# Patient Record
Sex: FEMALE | Race: WHITE | NOT HISPANIC OR LATINO | Employment: OTHER | URBAN - METROPOLITAN AREA
[De-identification: names, ages, dates, MRNs, and addresses within clinical notes are randomized per-mention and may not be internally consistent; named-entity substitution may affect disease eponyms.]

---

## 2017-04-20 ENCOUNTER — HOSPITAL ENCOUNTER (OUTPATIENT)
Dept: MAMMOGRAPHY | Facility: HOSPITAL | Age: 72
Discharge: HOME | End: 2017-04-20
Admitting: FAMILY MEDICINE

## 2017-04-20 DIAGNOSIS — Z12.31 VISIT FOR SCREENING MAMMOGRAM: ICD-10-CM

## 2017-04-20 PROCEDURE — G0202 SCR MAMMO BI INCL CAD: HCPCS | Performed by: RADIOLOGY

## 2017-04-20 PROCEDURE — 77063 BREAST TOMOSYNTHESIS BI: CPT

## 2017-04-20 PROCEDURE — G0202 SCR MAMMO BI INCL CAD: HCPCS

## 2017-04-20 PROCEDURE — 77063 BREAST TOMOSYNTHESIS BI: CPT | Performed by: RADIOLOGY

## 2017-04-27 ENCOUNTER — HOSPITAL ENCOUNTER (OUTPATIENT)
Dept: ULTRASOUND IMAGING | Facility: HOSPITAL | Age: 72
Discharge: HOME | End: 2017-04-27
Admitting: FAMILY MEDICINE

## 2017-04-27 ENCOUNTER — HOSPITAL ENCOUNTER (OUTPATIENT)
Dept: ULTRASOUND IMAGING | Facility: HOSPITAL | Age: 72
Discharge: HOME | End: 2017-04-27

## 2017-04-27 ENCOUNTER — HOSPITAL ENCOUNTER (OUTPATIENT)
Dept: MAMMOGRAPHY | Facility: HOSPITAL | Age: 72
Discharge: HOME | End: 2017-04-27

## 2017-04-27 DIAGNOSIS — R92.8 ABNORMAL MAMMOGRAM: ICD-10-CM

## 2017-04-27 PROCEDURE — 76642 ULTRASOUND BREAST LIMITED: CPT | Performed by: RADIOLOGY

## 2017-04-27 PROCEDURE — 88360 TUMOR IMMUNOHISTOCHEM/MANUAL: CPT | Performed by: RADIOLOGY

## 2017-04-27 PROCEDURE — G0206 DX MAMMO INCL CAD UNI: HCPCS | Performed by: RADIOLOGY

## 2017-04-27 PROCEDURE — 76642 ULTRASOUND BREAST LIMITED: CPT

## 2017-04-27 PROCEDURE — 88305 TISSUE EXAM BY PATHOLOGIST: CPT | Performed by: RADIOLOGY

## 2017-04-27 PROCEDURE — 19083 BX BREAST 1ST LESION US IMAG: CPT | Performed by: RADIOLOGY

## 2017-04-27 RX ORDER — LIDOCAINE HYDROCHLORIDE AND EPINEPHRINE 10; 10 MG/ML; UG/ML
10 INJECTION, SOLUTION INFILTRATION; PERINEURAL ONCE
Status: COMPLETED | OUTPATIENT
Start: 2017-04-27 | End: 2017-04-27

## 2017-04-27 RX ORDER — LIDOCAINE HYDROCHLORIDE 10 MG/ML
5 INJECTION, SOLUTION INFILTRATION; PERINEURAL ONCE
Status: COMPLETED | OUTPATIENT
Start: 2017-04-27 | End: 2017-04-27

## 2017-04-27 RX ADMIN — LIDOCAINE HYDROCHLORIDE,EPINEPHRINE BITARTRATE 8 ML: 10; .01 INJECTION, SOLUTION INFILTRATION; PERINEURAL at 14:13

## 2017-04-27 RX ADMIN — LIDOCAINE HYDROCHLORIDE 3 ML: 10 INJECTION, SOLUTION INFILTRATION; PERINEURAL at 14:10

## 2017-04-27 NOTE — PROGRESS NOTES
Alert and orientated. Denies discomfort. No active bleeding. Steri-strips & gauze intact. Ice packs given. Verbalizes and demonstrates understanding of post-care instructions and written copy given.

## 2017-05-03 LAB
CYTO UR: NORMAL
LAB AP CASE REPORT: NORMAL
LAB AP CLINICAL INFORMATION: NORMAL
LAB AP DIAGNOSIS COMMENT: NORMAL
LAB AP SPECIAL STAINS: NORMAL
Lab: NORMAL
PATH REPORT.FINAL DX SPEC: NORMAL
PATH REPORT.GROSS SPEC: NORMAL

## 2017-05-05 ENCOUNTER — TELEPHONE (OUTPATIENT)
Dept: MAMMOGRAPHY | Facility: HOSPITAL | Age: 72
End: 2017-05-05

## 2017-05-11 ENCOUNTER — DOCUMENTATION (OUTPATIENT)
Dept: OTHER | Facility: HOSPITAL | Age: 72
End: 2017-05-11

## 2017-05-16 ENCOUNTER — DOCUMENTATION (OUTPATIENT)
Dept: OTHER | Facility: HOSPITAL | Age: 72
End: 2017-05-16

## 2017-06-01 ENCOUNTER — TRANSCRIBE ORDERS (OUTPATIENT)
Dept: MAMMOGRAPHY | Facility: HOSPITAL | Age: 72
End: 2017-06-01

## 2017-06-01 DIAGNOSIS — D05.12 INTRADUCTAL CARCINOMA IN SITU OF LEFT BREAST: Primary | ICD-10-CM

## 2017-06-02 ENCOUNTER — DOCUMENTATION (OUTPATIENT)
Dept: OTHER | Facility: HOSPITAL | Age: 72
End: 2017-06-02

## 2017-06-02 NOTE — PROGRESS NOTES
Patient called to say that she did not understand wire loc. I explained it to her in detail - she verbalized understanding - she will have pre-op blood work done at there PCP with slip mailed from [facility] and will bring results to surgery as well as have PCP fax results to Dr Rodriguez's office

## 2017-06-13 ENCOUNTER — LAB REQUISITION (OUTPATIENT)
Dept: LAB | Facility: HOSPITAL | Age: 72
End: 2017-06-13

## 2017-06-13 ENCOUNTER — HOSPITAL ENCOUNTER (OUTPATIENT)
Dept: MAMMOGRAPHY | Facility: HOSPITAL | Age: 72
Discharge: HOME | End: 2017-06-13

## 2017-06-13 ENCOUNTER — HOSPITAL ENCOUNTER (OUTPATIENT)
Dept: MAMMOGRAPHY | Facility: HOSPITAL | Age: 72
Discharge: HOME | End: 2017-06-13
Attending: SURGERY | Admitting: SURGERY

## 2017-06-13 DIAGNOSIS — D05.12 INTRADUCTAL CARCINOMA IN SITU OF LEFT BREAST: ICD-10-CM

## 2017-06-13 PROCEDURE — 19283 PERQ DEV BREAST 1ST STRTCTC: CPT | Performed by: RADIOLOGY

## 2017-06-13 PROCEDURE — 76098 X-RAY EXAM SURGICAL SPECIMEN: CPT | Performed by: RADIOLOGY

## 2017-06-13 PROCEDURE — 76098 X-RAY EXAM SURGICAL SPECIMEN: CPT

## 2017-06-13 PROCEDURE — 88307 TISSUE EXAM BY PATHOLOGIST: CPT | Performed by: SURGERY

## 2017-06-13 PROCEDURE — G0206 DX MAMMO INCL CAD UNI: HCPCS | Performed by: RADIOLOGY

## 2017-06-13 RX ORDER — LIDOCAINE HYDROCHLORIDE 10 MG/ML
5 INJECTION, SOLUTION INFILTRATION; PERINEURAL ONCE
Status: COMPLETED | OUTPATIENT
Start: 2017-06-13 | End: 2017-06-13

## 2017-06-13 RX ADMIN — LIDOCAINE HYDROCHLORIDE 3 ML: 10 INJECTION, SOLUTION INFILTRATION; PERINEURAL at 09:25

## 2017-06-13 RX ADMIN — METHYLENE BLUE 10 MG: 10 INJECTION INTRAVENOUS at 09:26

## 2017-06-15 LAB
CYTO UR: NORMAL
LAB AP CASE REPORT: NORMAL
LAB AP CLINICAL INFORMATION: NORMAL
Lab: NORMAL
PATH REPORT.FINAL DX SPEC: NORMAL
PATH REPORT.GROSS SPEC: NORMAL

## 2017-06-28 PROBLEM — C50.919 BREAST CANCER: Status: ACTIVE | Noted: 2017-06-28

## 2017-06-29 ENCOUNTER — CONSULT (OUTPATIENT)
Dept: ONCOLOGY | Facility: CLINIC | Age: 72
End: 2017-06-29

## 2017-06-29 VITALS
BODY MASS INDEX: 33.49 KG/M2 | TEMPERATURE: 98.4 F | RESPIRATION RATE: 18 BRPM | SYSTOLIC BLOOD PRESSURE: 184 MMHG | HEIGHT: 65 IN | DIASTOLIC BLOOD PRESSURE: 82 MMHG | WEIGHT: 201 LBS | HEART RATE: 91 BPM

## 2017-06-29 DIAGNOSIS — C50.912 MALIGNANT NEOPLASM OF LEFT FEMALE BREAST, UNSPECIFIED SITE OF BREAST: Primary | ICD-10-CM

## 2017-06-29 PROCEDURE — 99205 OFFICE O/P NEW HI 60 MIN: CPT | Performed by: INTERNAL MEDICINE

## 2017-06-29 RX ORDER — OMEPRAZOLE 20 MG/1
20 CAPSULE, DELAYED RELEASE ORAL DAILY
COMMUNITY
End: 2022-05-10 | Stop reason: SDUPTHER

## 2017-06-29 RX ORDER — MELATONIN
1000 DAILY
COMMUNITY

## 2017-06-29 RX ORDER — ACETAMINOPHEN,DIPHENHYDRAMINE HCL 500; 25 MG/1; MG/1
2 TABLET, FILM COATED ORAL NIGHTLY PRN
COMMUNITY

## 2017-06-29 RX ORDER — ANASTROZOLE 1 MG/1
TABLET ORAL
Qty: 30 TABLET | Refills: 11 | Status: SHIPPED | OUTPATIENT
Start: 2017-06-29 | End: 2017-09-28 | Stop reason: SINTOL

## 2017-06-29 RX ORDER — LISINOPRIL 10 MG/1
10 TABLET ORAL DAILY
COMMUNITY
End: 2018-07-05 | Stop reason: DRUGHIGH

## 2017-06-29 NOTE — PROGRESS NOTES
CHIEF COMPLAINT: Management of breast cancer    REASON FOR REFERRAL: Management of breast cancer  RECORDS OBTAINED  Records of the patients history including those obtained from  Ozzie Samuel were reviewed and summarized in detail.    Oncology/Hematology History    1.  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  Area of involvement 0.5 cm, margins free, nuclear grade 1, estrogen receptor positive, progesteroneeceptor positive, stage pTis pNX, Stage 0.     2.  Past history of right breast stage IIa T2no MX hormone receptor positive HER-2/jairon negative breast cancer status post lumpectomy and sentinel node biopsy 2005 treated with radiation therapy and tamoxifen and tolerated tamoxifen well        Breast cancer    6/13/2017 Initial Diagnosis    Breast cancer         HISTORY OF PRESENT ILLNESS:  The patient is a 71 y.o.  female, referred for Management of breast cancer.  See above details for her history.  This was found on screening.  No symptoms.  REVIEW OF SYSTEMS:  A 14 point review of systems was performed and is negative except as noted above.    Past Medical History:   Diagnosis Date   • Breast cancer    • Hx of radiation therapy 2005     Past Surgical History:   Procedure Laterality Date   • BREAST BIOPSY Right    • BREAST LUMPECTOMY Right 2005       No current outpatient prescriptions on file prior to visit.     No current facility-administered medications on file prior to visit.        No Known Allergies    Social History     Social History   • Marital status:      Spouse name: N/A   • Number of children: N/A   • Years of education: N/A     Social History Main Topics   • Smoking status: Former Smoker   • Smokeless tobacco: Former User     Quit date: 2012   • Alcohol use None   • Drug use: None   • Sexual activity: Not Asked     Other Topics Concern   • None     Social History Narrative  "      Family History   Problem Relation Age of Onset   • Esophageal cancer Mother    • Lung cancer Sister    • Breast cancer Neg Hx    • Endometrial cancer Neg Hx    • Ovarian cancer Neg Hx        PHYSICAL EXAM:    BP (!) 184/82  Pulse 91  Temp 98.4 °F (36.9 °C)  Resp 18  Ht 65\" (165.1 cm)  Wt 201 lb (91.2 kg)  LMP  (LMP Unknown)  BMI 33.45 kg/m2    ECOG: (0) Fully active, able to carry on all predisease performance without restriction  General: well appearing female in no acute distress  HEENT: sclera anicteric, oropharynx clear  Lymphatics: no cervical, supraclavicular, inguinal, or axillary adenopathy  Cardiovascular: regular rate and rhythm, no murmurs  Neck: Supple; No thyromegaly  Lungs: clear to auscultation bilaterally. No respiratory distress.   Abdomen: soft, nontender, nondistended.  No palpable organomegaly  Extremities: no cyanosis, clubbing, edema, or cords  Skin: no rashes, lesions, bruising, or petechiae  Neuro: Alert and oriented x 4; Moving all extremities.  Psych: No anxiety or depression    Lab Requisition on 06/13/2017   Component Date Value Ref Range Status   • Case Report 06/13/2017    Final                    Value:Surgical Pathology Report                         Case: [id number]                                  Authorizing Provider:  Rashad Samuel MD         Collected:           06/13/2017 11:33 AM          Pathologist:           Arsalan Carrasquillo MD          Received:            06/13/2017 12:54 PM          Specimen:    Breast, Left                                                                              • Clinical Information 06/13/2017    Final                    Value:This result contains rich text formatting which cannot be displayed here.   • Final Diagnosis 06/13/2017    Final                    Value:This result contains rich text formatting which cannot be displayed here.   • Gross Description 06/13/2017    Final                    Value:This result contains rich text " formatting which cannot be displayed here.   • Microscopic Description 06/13/2017    Final                    Value:This result contains rich text formatting which cannot be displayed here.       Assessment/Plan     1. Ductal carcinoma in situ  2. Personal history of breast cancer    Discussion: She tolerated tamoxifen in the past.  Her uterus is still present.  She does not have a history of osteoporosis.  I will start her on Arimidex as much for primary as for secondary prevention purposes given her past history.  She is seeing Dr. Hua for radiation discussion.  This is a very well-differentiated 0.5 cm ductal carcinoma in situ arising out of a papilloma.  Its hormone receptor positive and we will treat her with Arimidex if she tolerates that if she doesn't would try tamoxifen and if she doesn't tolerate that than I would do nothing.  I have a very low threshold for stopping her adjuvant hormonal blockade.  We'll see my nurse practitioner back in a few months for a survivorship visit.      Uriel Leggett MD    6/29/2017

## 2017-07-12 ENCOUNTER — OFFICE VISIT (OUTPATIENT)
Dept: RADIATION ONCOLOGY | Facility: HOSPITAL | Age: 72
End: 2017-07-12

## 2017-07-12 ENCOUNTER — HOSPITAL ENCOUNTER (OUTPATIENT)
Dept: RADIATION ONCOLOGY | Facility: HOSPITAL | Age: 72
Setting detail: RADIATION/ONCOLOGY SERIES
Discharge: HOME | End: 2017-07-12

## 2017-07-12 VITALS
SYSTOLIC BLOOD PRESSURE: 169 MMHG | RESPIRATION RATE: 16 BRPM | HEART RATE: 97 BPM | DIASTOLIC BLOOD PRESSURE: 78 MMHG | HEIGHT: 65 IN | WEIGHT: 200.1 LBS | TEMPERATURE: 98.1 F | BODY MASS INDEX: 33.34 KG/M2

## 2017-07-12 DIAGNOSIS — C50.512 MALIGNANT NEOPLASM OF LOWER-OUTER QUADRANT OF LEFT FEMALE BREAST (HCC): Primary | ICD-10-CM

## 2017-07-12 PROCEDURE — G0463 HOSPITAL OUTPT CLINIC VISIT: HCPCS

## 2017-07-12 NOTE — PROGRESS NOTES
"CONSULTATION NOTE    NAME:      Letitia Salazar  :                                                          1945  DATE OF CONSULTATION:                       2017   REQUESTING PHYSICIAN:                   Rashad Samuel MD  REASON FOR CONSULTATION:           Breast cancer          Staging form: Breast, AJCC V7             Pathologic Stage 0 (Tis (DCIS), cN0, M0)     BRIEF HISTORY:  Letitia Salazar  is a very pleasant 71 y.o. female  with a history of right breast cancer , stage IIA, T2 N0 M0 ER/WI positive HER-2/jairon negative.  She was treated with lumpectomy, sentinel node biopsy and radiotherapy.  She then took tamoxifen.  She recently was found on mammogram to have an abnormality in the left breast and underwent lumpectomy that revealed low-grade ductal carcinoma in situ of 5 mm and margins were negative by 5 mm.  The tumor was ER/WI positive.  She has seen Dr. Leggett who is recommending Arimidex.  She is here to discuss postoperative radiotherapy.  She has no complaints from surgery.    No Known Allergies    Social History   Substance Use Topics   • Smoking status: Former Smoker   • Smokeless tobacco: Former User     Quit date:    • Alcohol use None       Past Medical History:   Diagnosis Date   • Breast cancer    • Hx of radiation therapy        family history includes Esophageal cancer in her mother; Lung cancer in her sister. There is no history of Breast cancer, Endometrial cancer, or Ovarian cancer.     Past Surgical History:   Procedure Laterality Date   • BREAST BIOPSY Right    • BREAST LUMPECTOMY Right         Review of Systems   All other systems reviewed and are negative.          Objective   VITAL SIGNS:   Vitals:    17 1056   BP: 169/78   Pulse: 97   Resp: 16   Temp: 98.1 °F (36.7 °C)   Weight: 200 lb 1.6 oz (90.8 kg)   Height: 65\" (165.1 cm)   PainSc: 0-No pain        KPS       90%    Physical Exam   Constitutional: She is oriented to person, place, and time. She appears " well-developed and well-nourished. No distress.   HENT:   Head: Normocephalic and atraumatic.   Eyes: EOM are normal. No scleral icterus.   Neck: Neck supple.   Cardiovascular: Normal rate, regular rhythm and normal heart sounds.    Pulmonary/Chest: Effort normal and breath sounds normal.   Lymphadenopathy:     She has no cervical adenopathy.   Neurological: She is alert and oriented to person, place, and time.   Skin: Skin is warm and dry.   Nursing note and vitals reviewed.  Examination of the breasts is negative.  She has a well healed surgical incision in the outer lower quadrant of the left breast.  The right breast is negative and she has no axillary adenopathy bilaterally.           The following portions of the patient's history were reviewed and updated as appropriate: allergies, current medications, past family history, past medical history, past social history, past surgical history and problem list.    Assessment      IMPRESSION:  Breast cancer    Staging form: Breast, AJCC V7     Pathologic Stage 0 (Tis (DCIS), cN0, M0)      RECOMMENDATIONS:  I recommend no radiotherapy for Mrs. Salazar.  She is over 69 years old with a low-grade tumor that is ER/HI positive and is willing to take Arimidex. This is consistent with NCCN guidelines.   She is delighted with this and we will see her back as needed.      Rachel Hua MD      Errors in dictation may reflect use of voice recognition software and not all errors in transcription may have been detected prior to signing.

## 2017-09-28 ENCOUNTER — CLINICAL SUPPORT (OUTPATIENT)
Dept: ONCOLOGY | Facility: CLINIC | Age: 72
End: 2017-09-28

## 2017-09-28 VITALS
HEIGHT: 65 IN | SYSTOLIC BLOOD PRESSURE: 154 MMHG | DIASTOLIC BLOOD PRESSURE: 79 MMHG | TEMPERATURE: 98.3 F | RESPIRATION RATE: 20 BRPM | BODY MASS INDEX: 33.66 KG/M2 | HEART RATE: 109 BPM | WEIGHT: 202 LBS

## 2017-09-28 DIAGNOSIS — D05.12 DUCTAL CARCINOMA IN SITU (DCIS) OF LEFT BREAST: ICD-10-CM

## 2017-09-28 DIAGNOSIS — R23.2 HOT FLASHES RELATED TO AROMATASE INHIBITOR THERAPY: Primary | ICD-10-CM

## 2017-09-28 DIAGNOSIS — Z85.3 HISTORY OF RIGHT BREAST CANCER: ICD-10-CM

## 2017-09-28 DIAGNOSIS — T45.1X5A HOT FLASHES RELATED TO AROMATASE INHIBITOR THERAPY: Primary | ICD-10-CM

## 2017-09-28 PROCEDURE — 99214 OFFICE O/P EST MOD 30 MIN: CPT | Performed by: NURSE PRACTITIONER

## 2017-09-28 RX ORDER — TAMOXIFEN CITRATE 20 MG/1
20 TABLET ORAL DAILY
Qty: 90 TABLET | Refills: 3
Start: 2017-09-28 | End: 2017-12-29

## 2017-09-28 NOTE — PROGRESS NOTES
CHIEF COMPLAINT: 1.  Follow-up for ductal carcinoma in situ                                       2.  Hot flashes    Problem List:  Oncology/Hematology History    1.  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  Area of involvement 0.5 cm, margins free, nuclear grade 1, estrogen receptor positive, progesteroneeceptor positive, stage pTis pNX, Stage 0.     2.  Past history of right breast stage IIa T2no MX hormone receptor positive HER-2/jairon negative breast cancer status post lumpectomy and sentinel node biopsy 2005 treated with radiation therapy and tamoxifen and tolerated tamoxifen well        Breast cancer    4/27/2017 Biopsy     Left breast mass.         6/13/2017 Initial Diagnosis     Breast cancer         6/13/2017 Surgery     Surgery       Procedure:  Lumpectomy      Location:  Left breast      Completeness of resection:  No evidence of residual tumor           6/29/2017 -  Hormonal Therapy     Arimidex.            HISTORY OF PRESENT ILLNESS:  The patient is a 72 y.o. female, here for follow up on management of DCIS of the left breast.  The patient reports that since being on Arimidex she is having significant hot flashes both throughout the day and at night.  These are quite bothersome to her.  She has no new or worrisome bony aches or pains.  No other complaints.      Past Medical History:   Diagnosis Date   • Breast cancer    • Hx of radiation therapy 2005     Past Surgical History:   Procedure Laterality Date   • BREAST BIOPSY Right    • BREAST LUMPECTOMY Right 2005       No Known Allergies    Family History and Social History reviewed and changed as necessary      REVIEW OF SYSTEM:   Review of Systems   Constitutional: Negative for appetite change, chills, diaphoresis, fatigue, fever and unexpected weight change.   HENT:   Negative for mouth sores, sore throat and trouble swallowing.   "  Eyes: Negative for icterus.   Respiratory: Negative for cough, hemoptysis and shortness of breath.    Cardiovascular: Negative for chest pain, leg swelling and palpitations.   Gastrointestinal: Negative for abdominal distention, abdominal pain, blood in stool, constipation, diarrhea, nausea and vomiting.   Endocrine: Positive for hot flashes.   Genitourinary: Negative for bladder incontinence, difficulty urinating, dysuria, frequency and hematuria.    Musculoskeletal: Negative for gait problem, neck pain and neck stiffness.   Skin: Negative for rash.   Neurological: Negative for dizziness, gait problem, headaches, light-headedness and numbness.   Hematological: Negative for adenopathy. Does not bruise/bleed easily.   Psychiatric/Behavioral: Negative for depression. The patient is not nervous/anxious.    All other systems reviewed and are negative.       PHYSICAL EXAM    Vitals:    09/28/17 1051   BP: 154/79   Pulse: 109   Resp: 20   Temp: 98.3 °F (36.8 °C)   Weight: 202 lb (91.6 kg)   Height: 65\" (165.1 cm)     Constitutional: Appears well-developed and well-nourished. No distress.   ECOG: (0) Fully active, able to carry on all predisease performance without restriction  HENT:   Head: Normocephalic.   Mouth/Throat: Oropharynx is clear and moist.   Eyes: Conjunctivae are normal. Pupils are equal, round, and reactive to light. No scleral icterus.   Neck: Neck supple. No JVD present. No thyromegaly present.   Cardiovascular: Normal rate, regular rhythm and normal heart sounds.    Pulmonary/Chest: Breath sounds normal. No respiratory distress.   Abdominal: Soft. Exhibits no distension.   Nodes: No cervical, supraclavicular or axillary nodes palpable on exam.    Musculoskeletal:Exhibits no edema, tenderness or deformity.   Neurological: Alert and oriented to person, place, and time. Exhibits normal muscle tone.   Skin: No ecchymosis, no petechiae and no rash noted. Not diaphoretic. No cyanosis. Nails show no clubbing. "   Psychiatric: Normal mood and affect.   Vitals reviewed.      Assessment/Plan     1.  DCIS of the left breast  2.  Hot flashes  3.  Personal history of right breast cancer    Discussion:  On Arimidex, Mrs. Salazar is having significant hot flashes.  She is interested in switching back to tamoxifen and I think this is a reasonable request.  We could consider keeping her on Arimidex and trying other medications to alleviate her hot flashes with gabapentin or clonidine or Effexor but these come with her own set of side effects and rather than adding a medication we will switch to tamoxifen.  She had tolerated this past.  I gave her a prescription today for tamoxifen 20 mg daily with quantity of 90 and 3 refills.  She will let me know if she has any issues.  We again reviewed the side effects of tamoxifen including but not limited to mood changes, hot flashes, endometrial thickening and slight increase of uterine cancer risk, blood clot, cataracts.  If she does not tolerate tamoxifen then we would stop adjuvant hormonal blockade.  We reviewed her survivorship care plan at today's appointment and she was provided a copy.  She will be due for post lumpectomy mammogram around December and I will arrange for that.  We discussed that it is typical standard procedure to do diagnostic mammogram after lumpectomy every 6 months for 2 years, she states that her insurance covers screening mammogram she is worried it will not cover diagnostic mammogram without her having to pay a significant co-pay.  I will check with our mammography center for further recommendations.  I will see her back in 3 months for follow-up to see how she is tolerating tamoxifen.  She will notify me in the interim if she has any new concerns.  She did meet with radiation oncology and there was no recommendation for radiation.      Veda Doe, APRN    09/28/2017

## 2017-10-10 DIAGNOSIS — Z85.3 HISTORY OF RIGHT BREAST CANCER: ICD-10-CM

## 2017-10-10 DIAGNOSIS — Z17.0 MALIGNANT NEOPLASM OF LOWER-OUTER QUADRANT OF LEFT BREAST OF FEMALE, ESTROGEN RECEPTOR POSITIVE (HCC): Primary | ICD-10-CM

## 2017-10-10 DIAGNOSIS — C50.512 MALIGNANT NEOPLASM OF LOWER-OUTER QUADRANT OF LEFT BREAST OF FEMALE, ESTROGEN RECEPTOR POSITIVE (HCC): Primary | ICD-10-CM

## 2017-12-21 ENCOUNTER — APPOINTMENT (OUTPATIENT)
Dept: MAMMOGRAPHY | Facility: HOSPITAL | Age: 72
End: 2017-12-21

## 2017-12-22 ENCOUNTER — HOSPITAL ENCOUNTER (OUTPATIENT)
Dept: MAMMOGRAPHY | Facility: HOSPITAL | Age: 72
Discharge: HOME | End: 2017-12-22
Admitting: NURSE PRACTITIONER

## 2017-12-22 DIAGNOSIS — Z85.3 HISTORY OF RIGHT BREAST CANCER: ICD-10-CM

## 2017-12-22 DIAGNOSIS — C50.512 MALIGNANT NEOPLASM OF LOWER-OUTER QUADRANT OF LEFT BREAST OF FEMALE, ESTROGEN RECEPTOR POSITIVE (HCC): ICD-10-CM

## 2017-12-22 DIAGNOSIS — Z17.0 MALIGNANT NEOPLASM OF LOWER-OUTER QUADRANT OF LEFT BREAST OF FEMALE, ESTROGEN RECEPTOR POSITIVE (HCC): ICD-10-CM

## 2017-12-22 PROCEDURE — G0204 DX MAMMO INCL CAD BI: HCPCS

## 2017-12-22 PROCEDURE — G0279 TOMOSYNTHESIS, MAMMO: HCPCS | Performed by: RADIOLOGY

## 2017-12-22 PROCEDURE — G0204 DX MAMMO INCL CAD BI: HCPCS | Performed by: RADIOLOGY

## 2017-12-22 PROCEDURE — G0279 TOMOSYNTHESIS, MAMMO: HCPCS

## 2017-12-29 ENCOUNTER — OFFICE VISIT (OUTPATIENT)
Dept: ONCOLOGY | Facility: CLINIC | Age: 72
End: 2017-12-29

## 2017-12-29 VITALS
RESPIRATION RATE: 20 BRPM | TEMPERATURE: 97.3 F | BODY MASS INDEX: 33.99 KG/M2 | SYSTOLIC BLOOD PRESSURE: 150 MMHG | DIASTOLIC BLOOD PRESSURE: 78 MMHG | WEIGHT: 204 LBS | HEIGHT: 65 IN | HEART RATE: 98 BPM

## 2017-12-29 DIAGNOSIS — D05.12 DUCTAL CARCINOMA IN SITU (DCIS) OF LEFT BREAST: Primary | ICD-10-CM

## 2017-12-29 PROCEDURE — 99213 OFFICE O/P EST LOW 20 MIN: CPT | Performed by: NURSE PRACTITIONER

## 2017-12-29 RX ORDER — ANASTROZOLE 1 MG/1
1 TABLET ORAL DAILY
Qty: 90 TABLET | Refills: 3 | Status: SHIPPED | OUTPATIENT
Start: 2017-12-29 | End: 2018-07-05 | Stop reason: SDUPTHER

## 2017-12-29 NOTE — PROGRESS NOTES
CHIEF COMPLAINT: Follow-up for breast cancer    Problem List:  Oncology/Hematology History    1.  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  Area of involvement 0.5 cm, margins free, nuclear grade 1, estrogen receptor positive, progesteroneeceptor positive, stage pTis pNX, Stage 0.     2.  Past history of right breast stage IIa T2no MX hormone receptor positive HER-2/jairon negative breast cancer status post lumpectomy and sentinel node biopsy 2005 treated with radiation therapy and tamoxifen and tolerated tamoxifen well        Breast cancer    4/27/2017 Biopsy     Left breast mass.         6/13/2017 Initial Diagnosis     Breast cancer         6/13/2017 Surgery     Surgery       Procedure:  Lumpectomy      Location:  Left breast      Completeness of resection:  No evidence of residual tumor           6/29/2017 -  Hormonal Therapy     Arimidex.            HISTORY OF PRESENT ILLNESS:  The patient is a 72 y.o. female, here for follow up on management of DCIS of the left breast.  After I saw the patient last in September and we discussed changing her from Arimidex to tamoxifen due to her significant hot flashes she stated that she went home and thought about it and decided to stay on Arimidex.  She states that she is tolerating it better and her hot flashes have diminished somewhat and are tolerable.  She had bilateral mammogram last week that was negative.  States that overall she is feeling well and has no new complaints.  No new or concerning bony aches or pains, no new or concerning findings on breast self-exam.      Past Medical History:   Diagnosis Date   • Breast cancer    • Hx of radiation therapy 2005     Past Surgical History:   Procedure Laterality Date   • BREAST BIOPSY Right    • BREAST LUMPECTOMY Right 2005   • BREAST LUMPECTOMY Left 2017       No Known Allergies    Family History and  "Social History reviewed and changed as necessary      REVIEW OF SYSTEM:   Review of Systems   Constitutional: Negative for appetite change, chills, diaphoresis, fatigue, fever and unexpected weight change.   HENT:   Negative for mouth sores, sore throat and trouble swallowing.    Eyes: Negative for icterus.   Respiratory: Negative for cough, hemoptysis and shortness of breath.    Cardiovascular: Negative for chest pain, leg swelling and palpitations.   Gastrointestinal: Negative for abdominal distention, abdominal pain, blood in stool, constipation, diarrhea, nausea and vomiting.   Endocrine: Negative for hot flashes.   Genitourinary: Negative for bladder incontinence, difficulty urinating, dysuria, frequency and hematuria.    Musculoskeletal: Negative for gait problem, neck pain and neck stiffness.   Skin: Negative for rash.   Neurological: Negative for dizziness, gait problem, headaches, light-headedness and numbness.   Hematological: Negative for adenopathy. Does not bruise/bleed easily.   Psychiatric/Behavioral: Negative for depression. The patient is not nervous/anxious.    All other systems reviewed and are negative.       PHYSICAL EXAM    Vitals:    12/29/17 1047   BP: 150/78   Pulse: 98   Resp: 20   Temp: 97.3 °F (36.3 °C)   Weight: 92.5 kg (204 lb)   Height: 165.1 cm (65\")     Constitutional: Appears well-developed and well-nourished. No distress.   ECOG: (0) Fully active, able to carry on all predisease performance without restriction  HENT:   Head: Normocephalic.   Mouth/Throat: Oropharynx is clear and moist.   Eyes: Conjunctivae are normal. Pupils are equal, round, and reactive to light. No scleral icterus.   Neck: Neck supple. No JVD present. No thyromegaly present.   Cardiovascular: Normal rate, regular rhythm and normal heart sounds.    Pulmonary/Chest: Breath sounds normal. No respiratory distress.   Breasts: Breast are pendulous, benign bilateral exam.  Nodes: No cervical, supraclavicular or " axillary nodes palpable on exam.  Abdominal: Soft. Exhibits no distension.   Musculoskeletal:Exhibits no edema, tenderness or deformity.   Neurological: Alert and oriented to person, place, and time. Exhibits normal muscle tone.   Skin: No ecchymosis, no petechiae and no rash noted. Not diaphoretic. No cyanosis. Nails show no clubbing.   Psychiatric: Normal mood and affect.   Vitals reviewed.    Diagnostic data: All previous office notes and current radiology reports including 12/22/2017 bilateral diagnostic mammogram that was BI-RADS 3, were reviewed at time of visit.    Assessment/Plan     1.  DCIS of the left breast  2.  Hot flashes  3.  Personal history of right breast cancer    Discussion: The patient decided after we saw her last in September not to switch to tamoxifen but to stay on Arimidex.  Her hot flashes have diminished and are tolerable.  I did give her a prescription today for Arimidex 1 mg daily quantity 90 with 3 refills.  No evidence of disease on clinical exam and no new worrisome symptoms.  Bilateral diagnostic mammogram last week was negative, BI-RADS 3.  She will be due for short-term 6 month follow-up mammogram of the left breast in June and I have ordered that today.  I will see her back following that for clinical exam.  She will need periodic monitoring of bone density testing while on Arimidex.    10 minutes of today's 15 minute appointment was spent in counseling and education regarding the above, prescription drug management and coordination of care.  Veda Doe, APRN    12/29/2017

## 2018-06-22 ENCOUNTER — APPOINTMENT (OUTPATIENT)
Dept: MAMMOGRAPHY | Facility: HOSPITAL | Age: 73
End: 2018-06-22

## 2018-07-02 ENCOUNTER — HOSPITAL ENCOUNTER (OUTPATIENT)
Dept: MAMMOGRAPHY | Facility: HOSPITAL | Age: 73
Discharge: HOME | End: 2018-07-02
Admitting: NURSE PRACTITIONER

## 2018-07-02 DIAGNOSIS — D05.12 DUCTAL CARCINOMA IN SITU (DCIS) OF LEFT BREAST: ICD-10-CM

## 2018-07-02 PROCEDURE — 77065 DX MAMMO INCL CAD UNI: CPT

## 2018-07-02 PROCEDURE — 77065 DX MAMMO INCL CAD UNI: CPT | Performed by: RADIOLOGY

## 2018-07-05 ENCOUNTER — OFFICE VISIT (OUTPATIENT)
Dept: ONCOLOGY | Facility: CLINIC | Age: 73
End: 2018-07-05

## 2018-07-05 VITALS
TEMPERATURE: 97.8 F | SYSTOLIC BLOOD PRESSURE: 158 MMHG | HEART RATE: 92 BPM | BODY MASS INDEX: 33.66 KG/M2 | RESPIRATION RATE: 18 BRPM | DIASTOLIC BLOOD PRESSURE: 81 MMHG | WEIGHT: 202 LBS | HEIGHT: 65 IN

## 2018-07-05 DIAGNOSIS — D05.12 DUCTAL CARCINOMA IN SITU (DCIS) OF LEFT BREAST: Primary | ICD-10-CM

## 2018-07-05 PROCEDURE — 99213 OFFICE O/P EST LOW 20 MIN: CPT | Performed by: NURSE PRACTITIONER

## 2018-07-05 RX ORDER — HYDROCHLOROTHIAZIDE 12.5 MG/1
TABLET ORAL DAILY
COMMUNITY
Start: 2018-06-14 | End: 2021-07-02

## 2018-07-05 RX ORDER — LISINOPRIL 40 MG/1
TABLET ORAL DAILY
COMMUNITY
Start: 2018-06-14 | End: 2022-09-01

## 2018-07-05 RX ORDER — ANASTROZOLE 1 MG/1
1 TABLET ORAL DAILY
Qty: 90 TABLET | Refills: 3 | Status: SHIPPED | OUTPATIENT
Start: 2018-07-05 | End: 2018-11-26 | Stop reason: SDUPTHER

## 2018-07-05 NOTE — PROGRESS NOTES
CHIEF COMPLAINT: Follow-up for breast cancer    Problem List:  Oncology/Hematology History    1.  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  Area of involvement 0.5 cm, margins free, nuclear grade 1, estrogen receptor positive, progesteroneeceptor positive, stage pTis pNX, Stage 0.     2.  Past history of right breast stage IIa T2no MX hormone receptor positive HER-2/jairon negative breast cancer status post lumpectomy and sentinel node biopsy 2005 treated with radiation therapy and tamoxifen and tolerated tamoxifen well        Breast cancer (CMS/HCC)    4/27/2017 Biopsy     Left breast mass.         6/13/2017 Initial Diagnosis     Breast cancer         6/13/2017 Surgery     Surgery       Procedure:  Lumpectomy      Location:  Left breast      Completeness of resection:  No evidence of residual tumor           6/29/2017 -  Hormonal Therapy     Arimidex.            HISTORY OF PRESENT ILLNESS:  The patient is a 72 y.o. female, here for follow up on management of DCIS of the left breast. Letitia has been feeling well since we saw her last with no new complaints.  States that she saw Dr. Samuel recently and had breast exam with no abnormal findings.  Had left diagnostic mammogram earlier this week that was BI-RADS 3.  No new or concerning findings on breast or chest wall exam, no new or concerning bony aches or pains.  Tolerating Arimidex with no side effects.  Hot flashes have diminished and are not bothersome.    Past Medical History:   Diagnosis Date   • Breast cancer (CMS/HCC)    • Hx of radiation therapy 2005     Past Surgical History:   Procedure Laterality Date   • BREAST BIOPSY Right    • BREAST LUMPECTOMY Right 2005   • BREAST LUMPECTOMY Left 2017       No Known Allergies    Family History and Social History reviewed and changed as necessary      REVIEW OF SYSTEM:   Review of Systems  "  Constitutional: Negative for appetite change, chills, diaphoresis, fatigue, fever and unexpected weight change.   HENT:   Negative for mouth sores, sore throat and trouble swallowing.    Eyes: Negative for icterus.   Respiratory: Negative for cough, hemoptysis and shortness of breath.    Cardiovascular: Negative for chest pain, leg swelling and palpitations.   Gastrointestinal: Negative for abdominal distention, abdominal pain, blood in stool, constipation, diarrhea, nausea and vomiting.   Endocrine: Negative for hot flashes.   Genitourinary: Negative for bladder incontinence, difficulty urinating, dysuria, frequency and hematuria.    Musculoskeletal: Negative for gait problem, neck pain and neck stiffness.   Skin: Negative for rash.   Neurological: Negative for dizziness, gait problem, headaches, light-headedness and numbness.   Hematological: Negative for adenopathy. Does not bruise/bleed easily.   Psychiatric/Behavioral: Negative for depression. The patient is not nervous/anxious.    All other systems reviewed and are negative.       PHYSICAL EXAM    Vitals:    07/05/18 0952   BP: 158/81   Pulse: 92   Resp: 18   Temp: 97.8 °F (36.6 °C)   Weight: 91.6 kg (202 lb)   Height: 165.1 cm (65\")     Constitutional: Appears well-developed and well-nourished. No distress.   ECOG: (0) Fully active, able to carry on all predisease performance without restriction  HENT:   Head: Normocephalic.   Mouth/Throat: Oropharynx is clear and moist.   Eyes: Conjunctivae are normal. Pupils are equal, round, and reactive to light. No scleral icterus.   Neck: Neck supple. No JVD present. No thyromegaly present.   Cardiovascular: Normal rate, regular rhythm and normal heart sounds.    Pulmonary/Chest: Breath sounds normal. No respiratory distress.   Breasts: Breast exam deferred  Nodes: No cervical, supraclavicular or axillary nodes palpable on exam.  Abdominal: Soft. Exhibits no distension.   Musculoskeletal:Exhibits no edema, tenderness " or deformity.   Neurological: Alert and oriented to person, place, and time. Exhibits normal muscle tone.   Skin: No ecchymosis, no petechiae and no rash noted. Not diaphoretic. No cyanosis. Nails show no clubbing.   Psychiatric: Normal mood and affect.   Vitals reviewed.    Diagnostic data: All previous office notes and current radiology reports including 7/02/2018 Left diagnostic mammogram that was BI-RADS 3, were reviewed at time of visit.    Assessment/Plan     1.  DCIS of the left breast  2.  Personal history of right breast cancer    Discussion: Letitia is doing well and has no evidence of disease on clinical exam and no new worrisome symptoms.  Current mammogram earlier this week of the left breast was negative, BI-RADS 3.  She will be due for bilateral diagnostic mammogram in 6 months, I will order that today.  Tolerating Arimidex, her hot flashes have diminished and are tolerable.  I did give her a prescription today for Arimidex 1 mg daily quantity 90 with 3 refills.  I will see her back in 6 months for follow-up, after that I will go to annual visits alternating with Dr. Samuel who sees her in June.  She will need periodic monitoring of bone density testing while on Arimidex, if she is not had bone density testing done by the time I see her back in 6 months I will order at that time.    10 minutes of today's 15 minute appointment was spent in counseling and education regarding the above, prescription drug management and coordination of care.  Veda Doe, BERNARD    07/05/2018

## 2018-11-26 ENCOUNTER — TELEPHONE (OUTPATIENT)
Dept: ONCOLOGY | Facility: CLINIC | Age: 73
End: 2018-11-26

## 2018-11-26 RX ORDER — ANASTROZOLE 1 MG/1
TABLET ORAL
Qty: 30 TABLET | Refills: 1 | Status: SHIPPED | OUTPATIENT
Start: 2018-11-26 | End: 2019-01-16 | Stop reason: SDUPTHER

## 2018-11-26 NOTE — TELEPHONE ENCOUNTER
PT notified of Arimidex 1mg #30 1RF sent in.  Keep appt with BERNARD Nino in Jan. 2019.    [id number]  ~Shell

## 2019-01-03 ENCOUNTER — HOSPITAL ENCOUNTER (OUTPATIENT)
Dept: MAMMOGRAPHY | Facility: HOSPITAL | Age: 74
Discharge: HOME | End: 2019-01-03
Admitting: NURSE PRACTITIONER

## 2019-01-03 ENCOUNTER — APPOINTMENT (OUTPATIENT)
Dept: MAMMOGRAPHY | Facility: HOSPITAL | Age: 74
End: 2019-01-03

## 2019-01-03 ENCOUNTER — HOSPITAL ENCOUNTER (OUTPATIENT)
Dept: ULTRASOUND IMAGING | Facility: HOSPITAL | Age: 74
Discharge: HOME | End: 2019-01-03

## 2019-01-03 DIAGNOSIS — D05.12 DUCTAL CARCINOMA IN SITU (DCIS) OF LEFT BREAST: ICD-10-CM

## 2019-01-03 PROCEDURE — G0279 TOMOSYNTHESIS, MAMMO: HCPCS | Performed by: RADIOLOGY

## 2019-01-03 PROCEDURE — 76642 ULTRASOUND BREAST LIMITED: CPT

## 2019-01-03 PROCEDURE — 77066 DX MAMMO INCL CAD BI: CPT | Performed by: RADIOLOGY

## 2019-01-03 PROCEDURE — G0279 TOMOSYNTHESIS, MAMMO: HCPCS

## 2019-01-03 PROCEDURE — 76642 ULTRASOUND BREAST LIMITED: CPT | Performed by: RADIOLOGY

## 2019-01-03 PROCEDURE — 77066 DX MAMMO INCL CAD BI: CPT

## 2019-01-16 ENCOUNTER — OFFICE VISIT (OUTPATIENT)
Dept: ONCOLOGY | Facility: CLINIC | Age: 74
End: 2019-01-16

## 2019-01-16 VITALS
BODY MASS INDEX: 33.49 KG/M2 | DIASTOLIC BLOOD PRESSURE: 72 MMHG | SYSTOLIC BLOOD PRESSURE: 154 MMHG | HEART RATE: 97 BPM | TEMPERATURE: 97.2 F | HEIGHT: 65 IN | WEIGHT: 201 LBS | RESPIRATION RATE: 20 BRPM

## 2019-01-16 DIAGNOSIS — Z12.31 ENCOUNTER FOR SCREENING MAMMOGRAM FOR BREAST CANCER: ICD-10-CM

## 2019-01-16 DIAGNOSIS — D05.12 DUCTAL CARCINOMA IN SITU (DCIS) OF LEFT BREAST: ICD-10-CM

## 2019-01-16 DIAGNOSIS — L73.2 AXILLARY HIDRADENITIS SUPPURATIVA: ICD-10-CM

## 2019-01-16 PROCEDURE — 99214 OFFICE O/P EST MOD 30 MIN: CPT | Performed by: NURSE PRACTITIONER

## 2019-01-16 RX ORDER — ANASTROZOLE 1 MG/1
1 TABLET ORAL DAILY
Qty: 90 TABLET | Refills: 3 | Status: SHIPPED | OUTPATIENT
Start: 2019-01-16 | End: 2020-01-13 | Stop reason: SDUPTHER

## 2019-01-16 NOTE — PROGRESS NOTES
CHIEF COMPLAINT: Follow-up for breast cancer    Problem List:  Oncology/Hematology History    1.  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  Area of involvement 0.5 cm, margins free, nuclear grade 1, estrogen receptor positive, progesteroneeceptor positive, stage pTis pNX, Stage 0.     2.  Past history of right breast stage IIa T2no MX hormone receptor positive HER-2/jairon negative breast cancer status post lumpectomy and sentinel node biopsy 2005 treated with radiation therapy and tamoxifen and tolerated tamoxifen well        Breast cancer (CMS/[facility])    4/27/2017 Biopsy     Left breast mass.         6/13/2017 Initial Diagnosis     Breast cancer         6/13/2017 Surgery     Surgery       Procedure:  Lumpectomy      Location:  Left breast      Completeness of resection:  No evidence of residual tumor           6/29/2017 -  Hormonal Therapy     Arimidex.            HISTORY OF PRESENT ILLNESS:  The patient is a 73 y.o. female, here for follow up on management of DCIS of the left breast. Letitia has been feeling well since we saw her last with no new complaints.  She is tolerating Arimidex with no unusual side effects, requesting a refill today.  Letitia had bilateral diagnostic mammogram and left breast ultrasound on 1/3/2019.  The right breast exam was benign, left breast showed lumpectomy changes with no suspicious calcifications or spiculated masses.  They did do ultrasound imaging of the left axillary region due to several prominent left axillary lymph nodes.  She has history of hydradenitis suppurativa and has been dealing with this for many years.  She was actually quite upset that they put her through what she felt like was excessive workup with her history.  She states that her left axillary region was sore but is now feeling better.  She did have lesions in the left axilla that have since  resolved, she now has one in her right axillary region.  She states these are no different than any of the areas of inflammation that she has had for years with this diagnosis.  Has had surgical removal at times, has underwent wound care, overall she feels it is stable and does not wish further intervention.    Past Medical History:   Diagnosis Date   • Breast cancer (CMS/HCC) 2017    LT   • Breast cancer (CMS/HCC) 2005    RT    • Hx of radiation therapy 2005    RT     Past Surgical History:   Procedure Laterality Date   • BREAST BIOPSY Right 2005   • BREAST LUMPECTOMY Right 2005   • BREAST LUMPECTOMY Left 2017       No Known Allergies    Family History and Social History reviewed and changed as necessary      REVIEW OF SYSTEM:   Review of Systems   Constitutional: Negative for appetite change, chills, diaphoresis, fatigue, fever and unexpected weight change.   HENT:   Negative for mouth sores, sore throat and trouble swallowing.    Eyes: Negative for icterus.   Respiratory: Negative for cough, hemoptysis and shortness of breath.    Cardiovascular: Negative for chest pain, leg swelling and palpitations.   Gastrointestinal: Negative for abdominal distention, abdominal pain, blood in stool, constipation, diarrhea, nausea and vomiting.   Endocrine: Negative for hot flashes.   Genitourinary: Negative for bladder incontinence, difficulty urinating, dysuria, frequency and hematuria.    Musculoskeletal: Negative for gait problem, neck pain and neck stiffness.   Skin: Negative for rash.   Neurological: Negative for dizziness, gait problem, headaches, light-headedness and numbness.   Hematological: Negative for adenopathy. Does not bruise/bleed easily.   Psychiatric/Behavioral: Negative for depression. The patient is not nervous/anxious.    All other systems reviewed and are negative.       PHYSICAL EXAM    Vitals:    01/16/19 0949   BP: 154/72   Pulse: 97   Resp: 20   Temp: 97.2 °F (36.2 °C)   Weight: 91.2 kg (201 lb)  "  Height: 165.1 cm (65\")     Constitutional: Appears well-developed and well-nourished. No distress.   ECOG: (0) Fully active, able to carry on all predisease performance without restriction  HENT:   Head: Normocephalic.   Mouth/Throat: Oropharynx is clear and moist.   Eyes: Conjunctivae are normal. Pupils are equal, round, and reactive to light. No scleral icterus.   Neck: Neck supple. No JVD present. No thyromegaly present.   Cardiovascular: Normal rate, regular rhythm and normal heart sounds.    Pulmonary/Chest: Breath sounds normal. No respiratory distress.   Breasts: Breast exam benign.  Nodes: No cervical, supraclavicular or axillary nodes palpable on exam.  Abdominal: Soft. Exhibits no distension.   Musculoskeletal:Exhibits no edema, tenderness or deformity.   Neurological: Alert and oriented to person, place, and time. Exhibits normal muscle tone.   Skin: No ecchymosis, no petechiae and no rash noted. Not diaphoretic. No cyanosis.   scarring noted in both axillary regions, greater on the left, there are a few open comedones bilaterally.  No lymphadenopathy.    Psychiatric: Normal mood and affect.   Vitals reviewed.    Diagnostic data: All previous office notes and current radiology reports including 1/3/19 bilateral diagnostic mammogram and ultrasound that was BI-RADS 3, were reviewed at time of visit.    Assessment/Plan     1.  DCIS of the left breast  2.  Personal history of right breast cancer  3.  Hydradenitis suppurativa    Discussion: Letitia is doing well and has no evidence of disease on clinical exam and no new worrisome symptoms.  Letitia had bilateral diagnostic mammogram and left breast ultrasound on 1/3/2019. The right breast exam was benign, left breast showed lumpectomy changes with no suspicious calcifications or spiculated masses.  They did do ultrasound imaging of the left axillary region due to \"several prominent left axillary lymph nodes\".  She has hydradenitis suppurativa and has been dealing " "with this for many years.   She states that her left axillary region was sore but is now feeling better.  She did have lesions in the left axilla that have since resolved, she now has one in her right axillary region, she has scarring and chronic skin changes.  She states these are no different than any of the areas of inflammation that she has had for years with this diagnosis.  Has had surgical removal previously with Dr. Samuel, has underwent wound care, overall she feels it is stable and does not wish further intervention.  Radiologist recommended 3 month left mammography follow-up however the patient states that she refuses to do this.  I do not think this is unreasonable given her history, I am comfortable not doing the three-month follow-up but just following her clinically.  We will now go to annual screening mammograms, she states she would prefer screening mammograms at this point as the diagnostic mammograms are an expense to her and the screening would be covered and it does not make her nervous to wait a week for the results.  We also discussed today getting bone density testing, she states that she declines testing stating \"I am just being honest, I am not going to do\".  She understands that there is risk of osteoporosis with Arimidex.  She will follow-up with Dr. Samuel in June who has been managing not only her breast cancer but has been seeing her for years in regards to her Hydradenitis suppurativa.  I will see her back in 1 year for follow-up this way she is seeing one of us every 6 months.  She understands to call if she has any concerns prior to return and I would be glad to see her at any time.  I did send a prescription on her Arimidex today, quantity of 90 with 3 refills.    20 minutes of today's 25 minute appointment was spent in counseling and education regarding the above, prescription drug management and coordination of care.    Veda Doe, APRN    01/16/2019          "

## 2019-08-09 ENCOUNTER — TRANSCRIBE ORDERS (OUTPATIENT)
Dept: ADMINISTRATIVE | Facility: HOSPITAL | Age: 74
End: 2019-08-09

## 2019-08-09 DIAGNOSIS — R92.8 ABNORMAL MAMMOGRAM: Primary | ICD-10-CM

## 2019-11-20 ENCOUNTER — TRANSCRIBE ORDERS (OUTPATIENT)
Dept: MAMMOGRAPHY | Facility: HOSPITAL | Age: 74
End: 2019-11-20

## 2019-11-20 ENCOUNTER — HOSPITAL ENCOUNTER (OUTPATIENT)
Dept: MAMMOGRAPHY | Facility: HOSPITAL | Age: 74
Discharge: HOME | End: 2019-11-20
Admitting: FAMILY MEDICINE

## 2019-11-20 DIAGNOSIS — R92.8 ABNORMAL MAMMOGRAM: Primary | ICD-10-CM

## 2019-11-20 DIAGNOSIS — R92.8 ABNORMAL MAMMOGRAM: ICD-10-CM

## 2019-11-20 PROCEDURE — 77065 DX MAMMO INCL CAD UNI: CPT | Performed by: RADIOLOGY

## 2019-11-20 PROCEDURE — 77065 DX MAMMO INCL CAD UNI: CPT

## 2020-01-13 ENCOUNTER — TELEPHONE (OUTPATIENT)
Dept: ONCOLOGY | Facility: CLINIC | Age: 75
End: 2020-01-13

## 2020-01-13 RX ORDER — ANASTROZOLE 1 MG/1
1 TABLET ORAL DAILY
Qty: 90 TABLET | Refills: 3 | Status: SHIPPED | OUTPATIENT
Start: 2020-01-13 | End: 2021-01-20 | Stop reason: SDUPTHER

## 2020-01-13 NOTE — TELEPHONE ENCOUNTER
Pt called [facility] stating that she is needing a prescription RF.  Pt did not list med nor preferred pharmacy.

## 2020-01-13 NOTE — TELEPHONE ENCOUNTER
[phone]  PT requesting Arimidex 1mg to be sent to [facility].  PT also requesting appt with BERNARD Nino in [place] office.  Please schedule appt for the next couple of weeks around 1030 per PT's request and call with appt information.  [phone]  ~Shell

## 2020-01-23 ENCOUNTER — OFFICE VISIT (OUTPATIENT)
Dept: ONCOLOGY | Facility: CLINIC | Age: 75
End: 2020-01-23

## 2020-01-23 VITALS
RESPIRATION RATE: 20 BRPM | WEIGHT: 203 LBS | DIASTOLIC BLOOD PRESSURE: 71 MMHG | HEART RATE: 94 BPM | BODY MASS INDEX: 33.82 KG/M2 | HEIGHT: 65 IN | SYSTOLIC BLOOD PRESSURE: 166 MMHG | TEMPERATURE: 98.1 F

## 2020-01-23 DIAGNOSIS — D05.12 DUCTAL CARCINOMA IN SITU (DCIS) OF LEFT BREAST: Primary | ICD-10-CM

## 2020-01-23 PROCEDURE — 99213 OFFICE O/P EST LOW 20 MIN: CPT | Performed by: NURSE PRACTITIONER

## 2020-01-23 NOTE — PROGRESS NOTES
CHIEF COMPLAINT: Follow-up DCIS of the left breast    Problem List:  Oncology/Hematology History    1.  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  Area of involvement 0.5 cm, margins free, nuclear grade 1, estrogen receptor positive, progesteroneeceptor positive, stage pTis pNX, Stage 0.     2.  Past history of right breast stage IIa T2no MX hormone receptor positive HER-2/jairon negative breast cancer status post lumpectomy and sentinel node biopsy 2005 treated with radiation therapy and tamoxifen and tolerated tamoxifen well        Breast cancer (CMS/[facility])    4/27/2017 Biopsy     Left breast mass.      6/13/2017 Initial Diagnosis     Breast cancer      6/13/2017 Surgery     Surgery       Procedure:  Lumpectomy      Location:  Left breast      Completeness of resection:  No evidence of residual tumor        6/29/2017 -  Hormonal Therapy     Arimidex.         HISTORY OF PRESENT ILLNESS:  The patient is a 74 y.o. female, here for follow up on management of DCIS of the left breast.  The patient is tolerating anastrozole with no unusual side effects.  She had left diagnostic mammogram in November that was negative, BI-RADS 3.  She is scheduled in February for bilateral diagnostic mammogram.  We had discussed when I saw her last diagnostic mammograms and she had decided she does not want to do those any longer however apparently our mammography department will not schedule her for screening mammogram therefore she states she will do 1 more diagnostic.  She has had no new or concerning findings on breast self-exam, no new or concerning bony aches or pains.      Past Medical History:   Diagnosis Date   • Breast cancer (CMS/[facility]) 2017    LT   • Breast cancer (CMS/[facility]) 2005    RT    • Hx of radiation therapy 2005    RT     Past Surgical History:   Procedure Laterality Date   • BREAST BIOPSY Right 2005   •  "BREAST LUMPECTOMY Right 2005   • BREAST LUMPECTOMY Left 2017       No Known Allergies    Family History and Social History reviewed and changed as necessary      REVIEW OF SYSTEM:   Review of Systems   Constitutional: Negative for appetite change, chills, diaphoresis, fatigue, fever and unexpected weight change.   HENT:   Negative for mouth sores, sore throat and trouble swallowing.    Eyes: Negative for icterus.   Respiratory: Negative for cough, hemoptysis and shortness of breath.    Cardiovascular: Negative for chest pain, leg swelling and palpitations.   Gastrointestinal: Negative for abdominal distention, abdominal pain, blood in stool, constipation, diarrhea, nausea and vomiting.   Endocrine: Negative for hot flashes.   Genitourinary: Negative for bladder incontinence, difficulty urinating, dysuria, frequency and hematuria.    Musculoskeletal: Negative for gait problem, neck pain and neck stiffness.   Skin: Negative for rash.   Neurological: Negative for dizziness, gait problem, headaches, light-headedness and numbness.   Hematological: Negative for adenopathy. Does not bruise/bleed easily.   Psychiatric/Behavioral: Negative for depression. The patient is not nervous/anxious.    All other systems reviewed and are negative.       PHYSICAL EXAM    Vitals:    01/23/20 1023   BP: 166/71   Pulse: 94   Resp: 20   Temp: 98.1 °F (36.7 °C)   Weight: 92.1 kg (203 lb)   Height: 165.1 cm (65\")     Constitutional: Appears well-developed and well-nourished. No distress.   ECOG: (0) Fully active, able to carry on all predisease performance without restriction  HENT:   Head: Normocephalic.   Mouth/Throat: Oropharynx is clear and moist.   Eyes: Conjunctivae are normal. Pupils are equal, round, and reactive to light. No scleral icterus.   Neck: Neck supple. No JVD present. No thyromegaly present.   Cardiovascular: Normal rate, regular rhythm and normal heart sounds.    Pulmonary/Chest: Breath sounds normal. No respiratory " distress.   Abdominal: Soft. Exhibits no distension and no mass. There is no hepatosplenomegaly. There is no tenderness. There is no rebound and no guarding.   Musculoskeletal:Exhibits no edema, tenderness or deformity.   Neurological: Alert and oriented to person, place, and time. Exhibits normal muscle tone.   Skin: No ecchymosis, no petechiae and no rash noted. Not diaphoretic. No cyanosis. Nails show no clubbing.   Psychiatric: Normal mood and affect.   Vitals reviewed.  Diagnostic data: All previous office notes and current radiology reports including 11/20/2019 left diagnostic mammogram that was BI-RADS 3 were reviewed at time of visit.      ASSESSMENT & PLAN:    1.  Ductal carcinoma in situ of the left breast status post lumpectomy June 2017, currently on therapy with Arimidex: No evidence of disease on clinical exam and no new worrisome symptoms.  The patient is tolerating Arimidex with no unusual side effects.  Refuses bone density testing.  She is up-to-date on mammography having had left diagnostic mammogram in November, she is due for bilateral diagnostic mammogram in February and that has been ordered and scheduled.  The patient has expressed previously that she did not want to do further diagnostic mammograms due to the cost.  Apparently our mammography center would not let her schedule a screening mammogram.  She states she will do 1 more diagnostic mammogram but after that she is requesting we go to screening mammograms.  I will see her back in 1 year for follow-up.    I spent a total of 15 minutes in direct patient care, greater than 10  minutes (greater than 50%) were spent in coordination of care, and counseling the patient regarding  (diagnosis) . Answered any questions patient had regarding medications and plan of care.     Veda Doe, APRN    01/23/2020

## 2020-02-21 ENCOUNTER — HOSPITAL ENCOUNTER (OUTPATIENT)
Dept: MAMMOGRAPHY | Facility: HOSPITAL | Age: 75
Discharge: HOME | End: 2020-02-21
Admitting: FAMILY MEDICINE

## 2020-02-21 DIAGNOSIS — R92.8 ABNORMAL MAMMOGRAM: ICD-10-CM

## 2020-02-21 PROCEDURE — G0279 TOMOSYNTHESIS, MAMMO: HCPCS | Performed by: RADIOLOGY

## 2020-02-21 PROCEDURE — G0279 TOMOSYNTHESIS, MAMMO: HCPCS

## 2020-02-21 PROCEDURE — 77066 DX MAMMO INCL CAD BI: CPT | Performed by: RADIOLOGY

## 2020-02-21 PROCEDURE — 77066 DX MAMMO INCL CAD BI: CPT

## 2021-01-15 ENCOUNTER — TRANSCRIBE ORDERS (OUTPATIENT)
Dept: ADMINISTRATIVE | Facility: HOSPITAL | Age: 76
End: 2021-01-15

## 2021-01-15 DIAGNOSIS — Z12.31 VISIT FOR SCREENING MAMMOGRAM: Primary | ICD-10-CM

## 2021-01-20 RX ORDER — ANASTROZOLE 1 MG/1
1 TABLET ORAL DAILY
Qty: 90 TABLET | Refills: 3 | Status: SHIPPED | OUTPATIENT
Start: 2021-01-20 | End: 2022-01-28 | Stop reason: SDUPTHER

## 2021-04-12 ENCOUNTER — APPOINTMENT (OUTPATIENT)
Dept: MAMMOGRAPHY | Facility: HOSPITAL | Age: 76
End: 2021-04-12

## 2021-04-19 ENCOUNTER — APPOINTMENT (OUTPATIENT)
Dept: MAMMOGRAPHY | Facility: HOSPITAL | Age: 76
End: 2021-04-19

## 2021-04-28 ENCOUNTER — TELEPHONE (OUTPATIENT)
Dept: ONCOLOGY | Facility: CLINIC | Age: 76
End: 2021-04-28

## 2021-04-28 NOTE — TELEPHONE ENCOUNTER
Caller: REYNA    Relationship: PT    Best call back number: [phone]     What is the best time to reach you: ANYTIME    What was the call regarding: REYNA CALLED TO CANCEL TOMORROW'S APPT. SHE WILL CALL BACK LATER ON TO RESCHEDULE.    Do you require a callback: NO

## 2021-05-13 ENCOUNTER — APPOINTMENT (OUTPATIENT)
Dept: GENERAL RADIOLOGY | Facility: HOSPITAL | Age: 76
End: 2021-05-13

## 2021-05-13 ENCOUNTER — HOSPITAL ENCOUNTER (EMERGENCY)
Facility: HOSPITAL | Age: 76
Discharge: HOME | End: 2021-05-14
Attending: EMERGENCY MEDICINE | Admitting: EMERGENCY MEDICINE

## 2021-05-13 DIAGNOSIS — R53.1 GENERALIZED WEAKNESS: Primary | ICD-10-CM

## 2021-05-13 DIAGNOSIS — R53.83 FATIGUE, UNSPECIFIED TYPE: ICD-10-CM

## 2021-05-13 LAB
ALBUMIN SERPL-MCNC: 4.1 G/DL (ref 3.5–5.2)
ALBUMIN/GLOB SERPL: 1.5 G/DL
ALP SERPL-CCNC: 80 U/L (ref 39–117)
ALT SERPL W P-5'-P-CCNC: 21 U/L (ref 1–33)
ANION GAP SERPL CALCULATED.3IONS-SCNC: 12 MMOL/L (ref 5–15)
AST SERPL-CCNC: 18 U/L (ref 1–32)
BACTERIA UR QL AUTO: NORMAL /HPF
BASOPHILS # BLD AUTO: 0.05 10*3/MM3 (ref 0–0.2)
BASOPHILS NFR BLD AUTO: 0.5 % (ref 0–1.5)
BILIRUB SERPL-MCNC: 0.2 MG/DL (ref 0–1.2)
BILIRUB UR QL STRIP: NEGATIVE
BUN SERPL-MCNC: 16 MG/DL (ref 8–23)
BUN/CREAT SERPL: 15.2 (ref 7–25)
CALCIUM SPEC-SCNC: 9.1 MG/DL (ref 8.6–10.5)
CHLORIDE SERPL-SCNC: 99 MMOL/L (ref 98–107)
CLARITY UR: CLEAR
CO2 SERPL-SCNC: 25 MMOL/L (ref 22–29)
COLOR UR: YELLOW
CREAT SERPL-MCNC: 1.05 MG/DL (ref 0.57–1)
DEPRECATED RDW RBC AUTO: 48.5 FL (ref 37–54)
EOSINOPHIL # BLD AUTO: 0.22 10*3/MM3 (ref 0–0.4)
EOSINOPHIL NFR BLD AUTO: 2.2 % (ref 0.3–6.2)
ERYTHROCYTE [DISTWIDTH] IN BLOOD BY AUTOMATED COUNT: 14.5 % (ref 12.3–15.4)
GFR SERPL CREATININE-BSD FRML MDRD: 51 ML/MIN/1.73
GLOBULIN UR ELPH-MCNC: 2.7 GM/DL
GLUCOSE SERPL-MCNC: 161 MG/DL (ref 65–99)
GLUCOSE UR STRIP-MCNC: NEGATIVE MG/DL
HCT VFR BLD AUTO: 39.9 % (ref 34–46.6)
HGB BLD-MCNC: 12.7 G/DL (ref 12–15.9)
HGB UR QL STRIP.AUTO: ABNORMAL
HOLD SPECIMEN: NORMAL
HOLD SPECIMEN: NORMAL
HYALINE CASTS UR QL AUTO: NORMAL /LPF
IMM GRANULOCYTES # BLD AUTO: 0.06 10*3/MM3 (ref 0–0.05)
IMM GRANULOCYTES NFR BLD AUTO: 0.6 % (ref 0–0.5)
KETONES UR QL STRIP: NEGATIVE
LEUKOCYTE ESTERASE UR QL STRIP.AUTO: ABNORMAL
LIPASE SERPL-CCNC: 59 U/L (ref 13–60)
LYMPHOCYTES # BLD AUTO: 3.18 10*3/MM3 (ref 0.7–3.1)
LYMPHOCYTES NFR BLD AUTO: 31.7 % (ref 19.6–45.3)
MCH RBC QN AUTO: 29 PG (ref 26.6–33)
MCHC RBC AUTO-ENTMCNC: 31.8 G/DL (ref 31.5–35.7)
MCV RBC AUTO: 91.1 FL (ref 79–97)
MONOCYTES # BLD AUTO: 0.81 10*3/MM3 (ref 0.1–0.9)
MONOCYTES NFR BLD AUTO: 8.1 % (ref 5–12)
NEUTROPHILS NFR BLD AUTO: 5.7 10*3/MM3 (ref 1.7–7)
NEUTROPHILS NFR BLD AUTO: 56.9 % (ref 42.7–76)
NITRITE UR QL STRIP: NEGATIVE
NRBC BLD AUTO-RTO: 0 /100 WBC (ref 0–0.2)
PH UR STRIP.AUTO: 5.5 [PH] (ref 5–8)
PLATELET # BLD AUTO: 336 10*3/MM3 (ref 140–450)
PMV BLD AUTO: 9.5 FL (ref 6–12)
POTASSIUM SERPL-SCNC: 4.2 MMOL/L (ref 3.5–5.2)
PROT SERPL-MCNC: 6.8 G/DL (ref 6–8.5)
PROT UR QL STRIP: NEGATIVE
RBC # BLD AUTO: 4.38 10*6/MM3 (ref 3.77–5.28)
RBC # UR: NORMAL /HPF
REF LAB TEST METHOD: NORMAL
SODIUM SERPL-SCNC: 136 MMOL/L (ref 136–145)
SP GR UR STRIP: 1.01 (ref 1–1.03)
SQUAMOUS #/AREA URNS HPF: NORMAL /HPF
UROBILINOGEN UR QL STRIP: ABNORMAL
WBC # BLD AUTO: 10.02 10*3/MM3 (ref 3.4–10.8)
WBC UR QL AUTO: NORMAL /HPF
WHOLE BLOOD HOLD SPECIMEN: NORMAL
WHOLE BLOOD HOLD SPECIMEN: NORMAL

## 2021-05-13 PROCEDURE — 99283 EMERGENCY DEPT VISIT LOW MDM: CPT

## 2021-05-13 PROCEDURE — 85025 COMPLETE CBC W/AUTO DIFF WBC: CPT | Performed by: EMERGENCY MEDICINE

## 2021-05-13 PROCEDURE — 81001 URINALYSIS AUTO W/SCOPE: CPT | Performed by: EMERGENCY MEDICINE

## 2021-05-13 PROCEDURE — 83690 ASSAY OF LIPASE: CPT | Performed by: EMERGENCY MEDICINE

## 2021-05-13 PROCEDURE — 71045 X-RAY EXAM CHEST 1 VIEW: CPT

## 2021-05-13 PROCEDURE — 80053 COMPREHEN METABOLIC PANEL: CPT | Performed by: EMERGENCY MEDICINE

## 2021-05-13 RX ORDER — SODIUM CHLORIDE 9 MG/ML
10 INJECTION INTRAVENOUS AS NEEDED
Status: DISCONTINUED | OUTPATIENT
Start: 2021-05-13 | End: 2021-05-14 | Stop reason: HOSPADM

## 2021-05-13 RX ORDER — MULTIPLE VITAMINS W/ MINERALS TAB 9MG-400MCG
1 TAB ORAL DAILY
COMMUNITY
End: 2022-05-10

## 2021-05-14 VITALS
HEIGHT: 65 IN | OXYGEN SATURATION: 97 % | BODY MASS INDEX: 33.32 KG/M2 | RESPIRATION RATE: 16 BRPM | TEMPERATURE: 98.1 F | SYSTOLIC BLOOD PRESSURE: 116 MMHG | HEART RATE: 87 BPM | DIASTOLIC BLOOD PRESSURE: 68 MMHG | WEIGHT: 200 LBS

## 2021-05-17 ENCOUNTER — OFFICE VISIT (OUTPATIENT)
Dept: CARDIOLOGY | Facility: CLINIC | Age: 76
End: 2021-05-17

## 2021-05-17 VITALS
OXYGEN SATURATION: 93 % | SYSTOLIC BLOOD PRESSURE: 156 MMHG | BODY MASS INDEX: 34.49 KG/M2 | WEIGHT: 207 LBS | HEIGHT: 65 IN | HEART RATE: 92 BPM | DIASTOLIC BLOOD PRESSURE: 78 MMHG

## 2021-05-17 DIAGNOSIS — R07.89 CHEST PAIN, ATYPICAL: Primary | ICD-10-CM

## 2021-05-17 PROCEDURE — 99203 OFFICE O/P NEW LOW 30 MIN: CPT | Performed by: PHYSICIAN ASSISTANT

## 2021-05-17 PROCEDURE — 93000 ELECTROCARDIOGRAM COMPLETE: CPT | Performed by: PHYSICIAN ASSISTANT

## 2021-05-17 NOTE — PROGRESS NOTES
[facility] at [facility]  INITIAL OFFICE CONSULT      Letitia Salazar  1945  PCP: Jeff Thompson MD    SUBJECTIVE:   Letitia Salazar is a 75 y.o. female seen for a consultation visit regarding the following:     Chief Complaint:   Chief Complaint   Patient presents with   • Rapid Heart Rate   • Irregular Heart Beat   • Palpitations   • Shortness of Breath   • Fatigue          Consultation is requested by BERNARD Coulter for evaluation of Rapid Heart Rate, Irregular Heart Beat, Palpitations, Shortness of Breath, and Fatigue        History:  Pleasant 75-year-old female presents today for initial consultation for primary care provider regarding chest pain shortness of breath.  Mrs. Deras denies any previous cardiac history.  She reports that her  had a longstanding cardiac history taken care of by Dr. Bryant.  The patient reports she has had 6 months of shortness of breath she is not been able to get to the physician she is concerned about the pandemic.  She is now had both her Covid shots.  Recent she feels her shortness of breath become progressively worse.  She also had some intermittent chest pain describes a tightness sensation central region her chest.  She denies any palpitations dizziness near syncope or syncope.  She denies heart failure/orthopnea PND palpitation.  Patient ports she did go to the emergency room regarding his symptoms and she was told that her laboratory data EKG were stable.  She did have a chest x-ray that revealed COPD.  She now presents today for further cardiac evaluation.      Cardiac PMH: (Old records have been reviewed and summarized below)  1. Chest pain  a. ER visit 5/14/2021, Negative cardiac markers, Normal EKG  2. SOB  3. HTN  4. Breast cancer, ductal carcinma  5. Tobacco abuse(Quit 2012)  6. HLD  7. Moderate Obesity  8. GERD    Past Medical History, Past Surgical History, Family history, Social History, and Medications were all  reviewed with the patient today and updated as necessary.     Current Outpatient Medications   Medication Sig Dispense Refill   • anastrozole (ARIMIDEX) 1 MG tablet Take 1 tablet by mouth Daily. 90 tablet 3   • cholecalciferol (VITAMIN D3) 1000 UNITS tablet Take 1,000 Units by mouth Daily.     • diphenhydrAMINE-acetaminophen (TYLENOL PM)  MG tablet per tablet Take 2 tablets by mouth At Night As Needed for Sleep.     • hydrochlorothiazide (HYDRODIURIL) 12.5 MG tablet Take  by mouth Daily.     • lisinopril (PRINIVIL,ZESTRIL) 40 MG tablet Take  by mouth Daily.     • multivitamin with minerals (PRESERVISION AREDS PO) Take 1 tablet by mouth Daily.     • omeprazole (priLOSEC) 20 MG capsule Take 20 mg by mouth Daily.       No current facility-administered medications for this visit.     No Known Allergies      Past Medical History:   Diagnosis Date   • Breast cancer (CMS/HCC)     LT   • Breast cancer (CMS/HCC)     RT    • Hx of radiation therapy     RT     Past Surgical History:   Procedure Laterality Date   • BREAST BIOPSY Right    • BREAST BIOPSY Left     u/s bx    • BREAST LUMPECTOMY Right    • BREAST LUMPECTOMY Left 2017     Family History   Problem Relation Age of Onset   • Esophageal cancer Mother    • Lung cancer Sister    • Breast cancer Neg Hx    • Endometrial cancer Neg Hx    • Ovarian cancer Neg Hx      Social History     Tobacco Use   • Smoking status: Former Smoker     Quit date:      Years since quittin.3   • Smokeless tobacco: Former User     Quit date:    Substance Use Topics   • Alcohol use: Not Currently       ROS:  Review of Symptoms:  General: no recent weight loss/gain, weakness or fatigue  Skin: no rashes, lumps, or other skin changes  HEENT: no dizziness, lightheadedness, or vision changes  Respiratory: no cough or hemoptysis  Cardiovascular: no palpitations, and tachycardia  Gastrointestinal: no black/tarry stools or diarrhea  Urinary: no change in frequency  "or urgency  Peripheral Vascular: no claudication or leg cramps  Musculoskeletal: no muscle or joint pain/stiffness  Psychiatric: no depression or excessive stress  Neurological: no sensory or motor loss, no syncope  Hematologic: no anemia, easy bruising or bleeding  Endocrine: no thyroid problems, nor heat or cold intolerance         PHYSICAL EXAM:   /78 (BP Location: Right arm, Patient Position: Sitting)   Pulse 92   Ht 165.1 cm (65\")   Wt 93.9 kg (207 lb)   LMP  (LMP Unknown)   SpO2 93%   BMI 34.45 kg/m²      Wt Readings from Last 5 Encounters:   05/17/21 93.9 kg (207 lb)   05/13/21 90.7 kg (200 lb)   01/23/20 92.1 kg (203 lb)   01/16/19 91.2 kg (201 lb)   07/05/18 91.6 kg (202 lb)     BP Readings from Last 5 Encounters:   05/17/21 156/78   05/14/21 116/68   01/23/20 166/71   01/16/19 154/72   07/05/18 158/81       General-Well Nourished, Well developed  Eyes - PERRLA  Neck- supple, No mass  CV- regular rate and rhythm, no MRG  Lung- clear bilaterally  Abd- soft, +BS  Musc/skel - Norm strength and range of motion  Skin- warm and dry  Neuro - Alert & Oriented x 3, appropriate mood.    Patient's external notes were reviewed.  Independent interpretation of test performed by another physician in facility were reviewed.  Outside laboratory data was also reviewed.    Medical problems and test results were reviewed with the patient today.     Results for orders placed or performed during the hospital encounter of 05/13/21   Comprehensive Metabolic Panel    Specimen: Blood   Result Value Ref Range    Glucose 161 (H) 65 - 99 mg/dL    BUN 16 8 - 23 mg/dL    Creatinine 1.05 (H) 0.57 - 1.00 mg/dL    Sodium 136 136 - 145 mmol/L    Potassium 4.2 3.5 - 5.2 mmol/L    Chloride 99 98 - 107 mmol/L    CO2 25.0 22.0 - 29.0 mmol/L    Calcium 9.1 8.6 - 10.5 mg/dL    Total Protein 6.8 6.0 - 8.5 g/dL    Albumin 4.10 3.50 - 5.20 g/dL    ALT (SGPT) 21 1 - 33 U/L    AST (SGOT) 18 1 - 32 U/L    Alkaline Phosphatase 80 39 - 117 U/L "    Total Bilirubin 0.2 0.0 - 1.2 mg/dL    eGFR Non African Amer 51 (L) >60 mL/min/1.73    Globulin 2.7 gm/dL    A/G Ratio 1.5 g/dL    BUN/Creatinine Ratio 15.2 7.0 - 25.0    Anion Gap 12.0 5.0 - 15.0 mmol/L   Lipase    Specimen: Blood   Result Value Ref Range    Lipase 59 13 - 60 U/L   Urinalysis With Microscopic If Indicated (No Culture) - Urine, Clean Catch    Specimen: Urine, Clean Catch   Result Value Ref Range    Color, UA Yellow Yellow, Straw    Appearance, UA Clear Clear    pH, UA 5.5 5.0 - 8.0    Specific Gravity, UA 1.011 1.001 - 1.030    Glucose, UA Negative Negative    Ketones, UA Negative Negative    Bilirubin, UA Negative Negative    Blood, UA Trace (A) Negative    Protein, UA Negative Negative    Leuk Esterase, UA Small (1+) (A) Negative    Nitrite, UA Negative Negative    Urobilinogen, UA 0.2 E.U./dL 0.2 - 1.0 E.U./dL   CBC Auto Differential    Specimen: Blood   Result Value Ref Range    WBC 10.02 3.40 - 10.80 10*3/mm3    RBC 4.38 3.77 - 5.28 10*6/mm3    Hemoglobin 12.7 12.0 - 15.9 g/dL    Hematocrit 39.9 34.0 - 46.6 %    MCV 91.1 79.0 - 97.0 fL    MCH 29.0 26.6 - 33.0 pg    MCHC 31.8 31.5 - 35.7 g/dL    RDW 14.5 12.3 - 15.4 %    RDW-SD 48.5 37.0 - 54.0 fl    MPV 9.5 6.0 - 12.0 fL    Platelets 336 140 - 450 10*3/mm3    Neutrophil % 56.9 42.7 - 76.0 %    Lymphocyte % 31.7 19.6 - 45.3 %    Monocyte % 8.1 5.0 - 12.0 %    Eosinophil % 2.2 0.3 - 6.2 %    Basophil % 0.5 0.0 - 1.5 %    Immature Grans % 0.6 (H) 0.0 - 0.5 %    Neutrophils, Absolute 5.70 1.70 - 7.00 10*3/mm3    Lymphocytes, Absolute 3.18 (H) 0.70 - 3.10 10*3/mm3    Monocytes, Absolute 0.81 0.10 - 0.90 10*3/mm3    Eosinophils, Absolute 0.22 0.00 - 0.40 10*3/mm3    Basophils, Absolute 0.05 0.00 - 0.20 10*3/mm3    Immature Grans, Absolute 0.06 (H) 0.00 - 0.05 10*3/mm3    nRBC 0.0 0.0 - 0.2 /100 WBC   Urinalysis, Microscopic Only - Urine, Clean Catch    Specimen: Urine, Clean Catch   Result Value Ref Range    RBC, UA 0-2 None Seen, 0-2 /HPF    WBC,  UA 0-2 None Seen, 0-2 /HPF    Bacteria, UA None Seen None Seen, Trace /HPF    Squamous Epithelial Cells, UA 0-2 None Seen, 0-2 /HPF    Hyaline Casts, UA 0-6 0 - 6 /LPF    Methodology Automated Microscopy    Light Blue Top   Result Value Ref Range    Extra Tube hold for add-on    Green Top (Gel)   Result Value Ref Range    Extra Tube Hold for add-ons.    Lavender Top   Result Value Ref Range    Extra Tube hold for add-on    Gold Top - SST   Result Value Ref Range    Extra Tube Hold for add-ons.          No results found for: CHOL, HDL, HDLC, LDL, LDLC, VLDL    EKG:  (EKG/Tracing has been independently visualized by me and summarized below)      ECG 12 Lead    Date/Time: 5/17/2021 10:56 AM  Performed by: Ifeanyi Drew PA  Authorized by: Ifeanyi Drew PA   Rhythm: sinus rhythm  Conduction: conduction normal  ST Segments: ST segments normal  T Waves: T waves normal  Other: no other findings    Clinical impression: normal ECG            ASSESSMENT   1. Chest pain  2. SOB  3. HTN, currently controlled on medications.   4. HLD  5. COPD, Remote Tobacco abuse.       PLAN  · Stress MPS  · Echocardiogram  · Consider Pulmonary evaluation regarding COPD.   · Follow up Dr. Bryant per Patient request.           Cardiology/Electrophysiology  05/17/21  09:32 EDT  Will Viki DELGADO

## 2021-05-20 ENCOUNTER — HOSPITAL ENCOUNTER (OUTPATIENT)
Dept: MAMMOGRAPHY | Facility: HOSPITAL | Age: 76
Discharge: HOME | End: 2021-05-20
Admitting: FAMILY MEDICINE

## 2021-05-20 DIAGNOSIS — Z12.31 VISIT FOR SCREENING MAMMOGRAM: ICD-10-CM

## 2021-05-20 PROCEDURE — 77063 BREAST TOMOSYNTHESIS BI: CPT | Performed by: RADIOLOGY

## 2021-05-20 PROCEDURE — 77063 BREAST TOMOSYNTHESIS BI: CPT

## 2021-05-20 PROCEDURE — 77067 SCR MAMMO BI INCL CAD: CPT

## 2021-05-20 PROCEDURE — 77067 SCR MAMMO BI INCL CAD: CPT | Performed by: RADIOLOGY

## 2021-05-24 ENCOUNTER — TELEPHONE (OUTPATIENT)
Dept: ONCOLOGY | Facility: CLINIC | Age: 76
End: 2021-05-24

## 2021-05-24 NOTE — TELEPHONE ENCOUNTER
Caller: LINA    Relationship to patient: PATIENT    Best call back number: [phone]     Type of visit: FOLLOW UP @ [facility]    Requested date: 05/27 AROUND 10:30AM    If rescheduling, when is the original appointment: 04/29    Additional notes: HUB UNABLE TO R/S WITHIN TIMEFRAME

## 2021-05-27 ENCOUNTER — OFFICE VISIT (OUTPATIENT)
Dept: ONCOLOGY | Facility: CLINIC | Age: 76
End: 2021-05-27

## 2021-05-27 VITALS
SYSTOLIC BLOOD PRESSURE: 138 MMHG | WEIGHT: 208 LBS | RESPIRATION RATE: 20 BRPM | TEMPERATURE: 97.3 F | BODY MASS INDEX: 34.66 KG/M2 | HEART RATE: 92 BPM | OXYGEN SATURATION: 97 % | HEIGHT: 65 IN | DIASTOLIC BLOOD PRESSURE: 68 MMHG

## 2021-05-27 DIAGNOSIS — D05.12 DUCTAL CARCINOMA IN SITU (DCIS) OF LEFT BREAST: Primary | ICD-10-CM

## 2021-05-27 PROCEDURE — 99214 OFFICE O/P EST MOD 30 MIN: CPT | Performed by: NURSE PRACTITIONER

## 2021-05-27 NOTE — PROGRESS NOTES
"CHIEF COMPLAINT: Breast cancer    Problem List:  Oncology/Hematology History Overview Note   1.  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  Area of involvement 0.5 cm, margins free, nuclear grade 1, estrogen receptor positive, progesteroneeceptor positive, stage pTis pNX, Stage 0.     2.  Past history of right breast stage IIa T2no MX hormone receptor positive HER-2/jairon negative breast cancer status post lumpectomy and sentinel node biopsy 2005 treated with radiation therapy and tamoxifen and tolerated tamoxifen well     Breast cancer (CMS/HCC)   4/27/2017 Biopsy    Left breast mass.     6/13/2017 Initial Diagnosis    Breast cancer     6/13/2017 Surgery    Surgery       Procedure:  Lumpectomy      Location:  Left breast      Completeness of resection:  No evidence of residual tumor       6/29/2017 -  Hormonal Therapy    Arimidex.         HISTORY OF PRESENT ILLNESS:  The patient is a 75 y.o. female, here for follow up on management of DCIS of the left breast currently on Arimidex, also past history of right breast cancer in 2005.  Letitia reports that she went to the ER earlier this month as she was \"just not feeling well\".  Lab work was unrevealing, she was scheduled to follow-up with her PCP and cardiology.  She saw Dr. Thompson earlier this week.  She has seen cardiology and is scheduled for a stress test and echocardiogram.  She states that she has some mild fatigue.  She had previously been having some nausea but that has resolved.  She has chronic mid back pain worse when she stands for a long period of time, the pain is relieved when she sits down and rest.  No other concerning bony aches or pains.  No concerning breast findings.  She has had her annual screening mammogram 5/20/2021 that was negative, BI-RADS 2.    Past Medical History:   Diagnosis Date   • Breast cancer (CMS/HCC) 2017    LT " "  • Breast cancer (CMS/HCC) 2005    RT    • Hx of radiation therapy 2005    RT     Past Surgical History:   Procedure Laterality Date   • BREAST BIOPSY Right 2005   • BREAST BIOPSY Left 2017    u/s bx    • BREAST LUMPECTOMY Right 2005   • BREAST LUMPECTOMY Left 2017       No Known Allergies    Family History and Social History reviewed and changed as necessary    REVIEW OF SYSTEM:   Positive for mild fatigue  Positive for intermittent back pain when standing    PHYSICAL EXAM:  General: Pleasant, well-developed, well-nourished female in no distress  Nodes: No palpable cervical, supraclavicular or axillary nodes on exam    Vitals:    05/27/21 1259   BP: 138/68   Pulse: 92   Resp: 20   Temp: 97.3 °F (36.3 °C)   SpO2: 97%   Weight: 94.3 kg (208 lb)   Height: 165.1 cm (65\")     Vitals:    05/27/21 1259   PainSc: 0-No pain          ECOG score: 0         Vitals reviewed.    Labs and ER notes from 5/14/2021 available in epic were reviewed at time of visit along with note from cardiology visit 5/17/2021    Lab Results   Component Value Date    HGB 12.7 05/13/2021    HCT 39.9 05/13/2021    MCV 91.1 05/13/2021     05/13/2021    WBC 10.02 05/13/2021    NEUTROABS 5.70 05/13/2021    LYMPHSABS 3.18 (H) 05/13/2021    MONOSABS 0.81 05/13/2021    EOSABS 0.22 05/13/2021    BASOSABS 0.05 05/13/2021       Lab Results   Component Value Date    GLUCOSE 161 (H) 05/13/2021    BUN 16 05/13/2021    CREATININE 1.05 (H) 05/13/2021     05/13/2021    K 4.2 05/13/2021    CL 99 05/13/2021    CO2 25.0 05/13/2021    CALCIUM 9.1 05/13/2021    PROTEINTOT 6.8 05/13/2021    ALBUMIN 4.10 05/13/2021    BILITOT 0.2 05/13/2021    ALKPHOS 80 05/13/2021    AST 18 05/13/2021    ALT 21 05/13/2021             ASSESSMENT & PLAN:    1.  Ductal carcinoma in situ of the left breast status post lumpectomy June 2017, currently on therapy with Arimidex: Letitia is tolerating Arimidex with no significant side effects.  She continues to refuse bone density " testing.  She is up-to-date on mammography having had bilateral screening mammogram 5/20/2021 that was negative, BI-RADS 2.  She will continue with annual screening mammogram.  She will continue Arimidex until I see her back in 1 year and then she will have completed 5 years of therapy.  I reviewed all notes from her recent ER visit and cardiology follow-up along with laboratory data 5/13/2021.  There is nothing that seems to be related or worrisome for recurrence of her breast cancer, she also has no symptoms currently that are worrisome for recurrence of her breast cancer.  Her back pain is intermittent and tends to occur when she stands which sounds more arthritic in nature.  She has had x-rays per her report through her primary care provider which showed arthritis.  Her alkaline phosphatase on CMP was normal.    Return to clinic in 1 year for follow-up.    This was a level 4, moderate MDM visit with 1 stable chronic illness, prescription drug management and review of current mammogram and review of recent ER records and laboratory data 5/13/2021 along with cardiology follow-up office note.  Veda Doe, APRN    05/27/2021

## 2021-06-25 ENCOUNTER — HOSPITAL ENCOUNTER (OUTPATIENT)
Dept: CARDIOLOGY | Facility: HOSPITAL | Age: 76
Discharge: HOME | End: 2021-06-25
Admitting: PHYSICIAN ASSISTANT

## 2021-06-25 VITALS — BODY MASS INDEX: 34.66 KG/M2 | WEIGHT: 208 LBS | HEIGHT: 65 IN

## 2021-06-25 VITALS
HEART RATE: 93 BPM | HEIGHT: 65 IN | DIASTOLIC BLOOD PRESSURE: 78 MMHG | BODY MASS INDEX: 34.66 KG/M2 | WEIGHT: 208 LBS | SYSTOLIC BLOOD PRESSURE: 136 MMHG

## 2021-06-25 DIAGNOSIS — R07.89 CHEST PAIN, ATYPICAL: ICD-10-CM

## 2021-06-25 LAB
BH CV ECHO MEAS - AO MAX PG (FULL): 7.3 MMHG
BH CV ECHO MEAS - AO MAX PG: 12 MMHG
BH CV ECHO MEAS - AO MEAN PG (FULL): 4.5 MMHG
BH CV ECHO MEAS - AO MEAN PG: 7.2 MMHG
BH CV ECHO MEAS - AO ROOT AREA (BSA CORRECTED): 1.5
BH CV ECHO MEAS - AO ROOT AREA: 7.5 CM^2
BH CV ECHO MEAS - AO ROOT DIAM: 3.1 CM
BH CV ECHO MEAS - AO V2 MAX: 169.7 CM/SEC
BH CV ECHO MEAS - AO V2 MEAN: 126.2 CM/SEC
BH CV ECHO MEAS - AO V2 VTI: 36.4 CM
BH CV ECHO MEAS - ASC AORTA: 2.5 CM
BH CV ECHO MEAS - AVA(I,A): 2.1 CM^2
BH CV ECHO MEAS - AVA(I,D): 2.1 CM^2
BH CV ECHO MEAS - AVA(V,A): 2.1 CM^2
BH CV ECHO MEAS - AVA(V,D): 2.1 CM^2
BH CV ECHO MEAS - BSA(HAYCOCK): 2.1 M^2
BH CV ECHO MEAS - BSA: 2 M^2
BH CV ECHO MEAS - BZI_BMI: 34.6 KILOGRAMS/M^2
BH CV ECHO MEAS - BZI_METRIC_HEIGHT: 165.1 CM
BH CV ECHO MEAS - BZI_METRIC_WEIGHT: 94.3 KG
BH CV ECHO MEAS - EDV(CUBED): 80.6 ML
BH CV ECHO MEAS - EDV(MOD-SP2): 62.6 ML
BH CV ECHO MEAS - EDV(MOD-SP4): 70.7 ML
BH CV ECHO MEAS - EDV(TEICH): 83.9 ML
BH CV ECHO MEAS - EF(CUBED): 65.7 %
BH CV ECHO MEAS - EF(MOD-BP): 56.4 %
BH CV ECHO MEAS - EF(MOD-SP2): 59.9 %
BH CV ECHO MEAS - EF(MOD-SP4): 55.2 %
BH CV ECHO MEAS - EF(TEICH): 57.5 %
BH CV ECHO MEAS - ESV(CUBED): 27.6 ML
BH CV ECHO MEAS - ESV(MOD-SP2): 25.1 ML
BH CV ECHO MEAS - ESV(MOD-SP4): 31.7 ML
BH CV ECHO MEAS - ESV(TEICH): 35.6 ML
BH CV ECHO MEAS - FS: 30 %
BH CV ECHO MEAS - IVS/LVPW: 0.81
BH CV ECHO MEAS - IVSD: 0.55 CM
BH CV ECHO MEAS - LA DIMENSION: 2.7 CM
BH CV ECHO MEAS - LA/AO: 0.89
BH CV ECHO MEAS - LAD MAJOR: 4.1 CM
BH CV ECHO MEAS - LAT PEAK E' VEL: 6.6 CM/SEC
BH CV ECHO MEAS - LATERAL E/E' RATIO: 13.5
BH CV ECHO MEAS - LV DIASTOLIC VOL/BSA (35-75): 35.1 ML/M^2
BH CV ECHO MEAS - LV IVRT: 0.11 SEC
BH CV ECHO MEAS - LV MASS(C)D: 74.9 GRAMS
BH CV ECHO MEAS - LV MASS(C)DI: 37.2 GRAMS/M^2
BH CV ECHO MEAS - LV MAX PG: 4.7 MMHG
BH CV ECHO MEAS - LV MEAN PG: 2.7 MMHG
BH CV ECHO MEAS - LV SYSTOLIC VOL/BSA (12-30): 15.8 ML/M^2
BH CV ECHO MEAS - LV V1 MAX: 108.8 CM/SEC
BH CV ECHO MEAS - LV V1 MEAN: 75.7 CM/SEC
BH CV ECHO MEAS - LV V1 VTI: 23.7 CM
BH CV ECHO MEAS - LVIDD: 4.3 CM
BH CV ECHO MEAS - LVIDS: 3 CM
BH CV ECHO MEAS - LVLD AP2: 7.1 CM
BH CV ECHO MEAS - LVLD AP4: 8 CM
BH CV ECHO MEAS - LVLS AP2: 5.8 CM
BH CV ECHO MEAS - LVLS AP4: 6.9 CM
BH CV ECHO MEAS - LVOT AREA (M): 3.1 CM^2
BH CV ECHO MEAS - LVOT AREA: 3.2 CM^2
BH CV ECHO MEAS - LVOT DIAM: 2 CM
BH CV ECHO MEAS - LVPWD: 0.67 CM
BH CV ECHO MEAS - MED PEAK E' VEL: 5.3 CM/SEC
BH CV ECHO MEAS - MEDIAL E/E' RATIO: 16.8
BH CV ECHO MEAS - MV A MAX VEL: 102.8 CM/SEC
BH CV ECHO MEAS - MV DEC SLOPE: 373.2 CM/SEC^2
BH CV ECHO MEAS - MV DEC TIME: 0.24 SEC
BH CV ECHO MEAS - MV E MAX VEL: 89.4 CM/SEC
BH CV ECHO MEAS - MV E/A: 0.87
BH CV ECHO MEAS - PA ACC TIME: 0.14 SEC
BH CV ECHO MEAS - PA PR(ACCEL): 17.2 MMHG
BH CV ECHO MEAS - SI(AO): 135.9 ML/M^2
BH CV ECHO MEAS - SI(CUBED): 26.3 ML/M^2
BH CV ECHO MEAS - SI(LVOT): 38.2 ML/M^2
BH CV ECHO MEAS - SI(MOD-SP2): 18.6 ML/M^2
BH CV ECHO MEAS - SI(MOD-SP4): 19.4 ML/M^2
BH CV ECHO MEAS - SI(TEICH): 24 ML/M^2
BH CV ECHO MEAS - SV(AO): 273.4 ML
BH CV ECHO MEAS - SV(CUBED): 53 ML
BH CV ECHO MEAS - SV(LVOT): 76.9 ML
BH CV ECHO MEAS - SV(MOD-SP2): 37.5 ML
BH CV ECHO MEAS - SV(MOD-SP4): 39 ML
BH CV ECHO MEAS - SV(TEICH): 48.3 ML
BH CV ECHO MEAS - TAPSE (>1.6): 1.9 CM
BH CV ECHO MEASUREMENTS AVERAGE E/E' RATIO: 15.03
BH CV VAS BP RIGHT ARM: NORMAL MMHG
BH CV XLRA - RV BASE: 2.8 CM
BH CV XLRA - RV LENGTH: 6.1 CM
BH CV XLRA - RV MID: 2.8 CM
BH CV XLRA - TDI S': 12.5 CM/SEC
LEFT ATRIUM VOLUME INDEX: 13.6 ML/M^2
LEFT ATRIUM VOLUME: 27.4 ML
LV EF 2D ECHO EST: 60 %
MAXIMAL PREDICTED HEART RATE: 144 BPM
STRESS TARGET HR: 122 BPM

## 2021-06-25 PROCEDURE — 93017 CV STRESS TEST TRACING ONLY: CPT

## 2021-06-25 PROCEDURE — 78452 HT MUSCLE IMAGE SPECT MULT: CPT

## 2021-06-25 PROCEDURE — 25010000002 REGADENOSON 0.4 MG/5ML SOLUTION: Performed by: PHYSICIAN ASSISTANT

## 2021-06-25 PROCEDURE — 93018 CV STRESS TEST I&R ONLY: CPT | Performed by: INTERNAL MEDICINE

## 2021-06-25 PROCEDURE — A9500 TC99M SESTAMIBI: HCPCS | Performed by: PHYSICIAN ASSISTANT

## 2021-06-25 PROCEDURE — 0 TECHNETIUM SESTAMIBI: Performed by: PHYSICIAN ASSISTANT

## 2021-06-25 PROCEDURE — 93306 TTE W/DOPPLER COMPLETE: CPT

## 2021-06-25 PROCEDURE — 93306 TTE W/DOPPLER COMPLETE: CPT | Performed by: INTERNAL MEDICINE

## 2021-06-25 PROCEDURE — 78452 HT MUSCLE IMAGE SPECT MULT: CPT | Performed by: INTERNAL MEDICINE

## 2021-06-25 RX ADMIN — TECHNETIUM TC 99M SESTAMIBI 1 DOSE: 1 INJECTION INTRAVENOUS at 10:35

## 2021-06-25 RX ADMIN — REGADENOSON 0.4 MG: 0.08 INJECTION, SOLUTION INTRAVENOUS at 12:23

## 2021-06-25 RX ADMIN — TECHNETIUM TC 99M SESTAMIBI 1 DOSE: 1 INJECTION INTRAVENOUS at 12:25

## 2021-06-26 LAB
BH CV REST NUCLEAR ISOTOPE DOSE: 9.9 MCI
BH CV STRESS BP STAGE 2: NORMAL
BH CV STRESS BP STAGE 4: NORMAL
BH CV STRESS COMMENTS STAGE 1: NORMAL
BH CV STRESS DOSE REGADENOSON STAGE 1: 0.4
BH CV STRESS DURATION MIN STAGE 1: 1
BH CV STRESS DURATION MIN STAGE 2: 1
BH CV STRESS HR STAGE 1: 109
BH CV STRESS HR STAGE 2: 109
BH CV STRESS HR STAGE 3: 103
BH CV STRESS HR STAGE 4: 105
BH CV STRESS NUCLEAR ISOTOPE DOSE: 32.9 MCI
BH CV STRESS O2 STAGE 2: 96
BH CV STRESS O2 STAGE 3: 95
BH CV STRESS O2 STAGE 4: 95
BH CV STRESS PROTOCOL 1: NORMAL
BH CV STRESS RECOVERY BP: NORMAL MMHG
BH CV STRESS RECOVERY HR: 98 BPM
BH CV STRESS STAGE 1: 1
BH CV STRESS STAGE 2: 2
BH CV STRESS STAGE 3: 3
BH CV STRESS STAGE 4: 4
LV EF NUC BP: 69 %
MAXIMAL PREDICTED HEART RATE: 144 BPM
PERCENT MAX PREDICTED HR: 78.47 %
STRESS BASELINE BP: NORMAL MMHG
STRESS BASELINE HR: 91 BPM
STRESS O2 SAT REST: 95 %
STRESS PERCENT HR: 92 %
STRESS POST EXERCISE DUR MIN: 4 MIN
STRESS POST EXERCISE DUR SEC: 17 SEC
STRESS POST PEAK BP: NORMAL MMHG
STRESS POST PEAK HR: 113 BPM
STRESS TARGET HR: 122 BPM

## 2021-07-02 ENCOUNTER — OFFICE VISIT (OUTPATIENT)
Dept: CARDIOLOGY | Facility: CLINIC | Age: 76
End: 2021-07-02

## 2021-07-02 VITALS
DIASTOLIC BLOOD PRESSURE: 72 MMHG | SYSTOLIC BLOOD PRESSURE: 144 MMHG | HEIGHT: 65 IN | HEART RATE: 105 BPM | OXYGEN SATURATION: 96 % | BODY MASS INDEX: 34.55 KG/M2 | WEIGHT: 207.4 LBS

## 2021-07-02 DIAGNOSIS — I10 ESSENTIAL HYPERTENSION: ICD-10-CM

## 2021-07-02 DIAGNOSIS — R07.89 CHEST PAIN, ATYPICAL: ICD-10-CM

## 2021-07-02 DIAGNOSIS — I25.10 CORONARY ARTERY DISEASE INVOLVING NATIVE CORONARY ARTERY OF NATIVE HEART WITHOUT ANGINA PECTORIS: Primary | ICD-10-CM

## 2021-07-02 DIAGNOSIS — E78.2 MIXED HYPERLIPIDEMIA: ICD-10-CM

## 2021-07-02 PROCEDURE — 99214 OFFICE O/P EST MOD 30 MIN: CPT | Performed by: INTERNAL MEDICINE

## 2021-07-02 RX ORDER — ATORVASTATIN CALCIUM 20 MG/1
20 TABLET, FILM COATED ORAL DAILY
Qty: 30 TABLET | Refills: 11 | Status: SHIPPED | OUTPATIENT
Start: 2021-07-02 | End: 2022-05-10 | Stop reason: SDUPTHER

## 2021-07-02 RX ORDER — HYDROCHLOROTHIAZIDE 25 MG/1
1 TABLET ORAL DAILY
COMMUNITY
Start: 2021-05-24 | End: 2022-05-10 | Stop reason: SDUPTHER

## 2021-07-02 NOTE — PROGRESS NOTES
[facility]  Office Progress Note  Letitia Salazar  1945  [address] [facility] [place] [address]       Visit Date: 07/02/21    PCP: Jeff Thompson MD  [address] [address]    IDENTIFICATION: A 75 y.o. female retired [employer].    PROBLEM LIST:   1. Chest pain  a. ER visit 5/14/2021, Negative cardiac markers, Normal EKG  b. 6/25/2020 one 2D echo LVEF =56 to 60%.  LV diastolic function (grade 1) impaired relaxation.  c. 6/25/2021 Lexiscan MPS: Lexiscan WNL.  LVEF calculated =69%.  Moderate diffuse coronary calcifications.  2. SOB  3. HTN  4. Breast cancer, ductal carcinma  5. Tobacco abuse(Quit 2012)  6. HLD  1. 2017: , , HDL 37, .  7. Moderate Obesity  8. GERD  9. Surgical  1. Breat Lumpectomy for Cancer        CC:   Chief Complaint   Patient presents with   • Shortness of Breath       Allergies  No Known Allergies    Current Medications    Current Outpatient Medications:   •  anastrozole (ARIMIDEX) 1 MG tablet, Take 1 tablet by mouth Daily., Disp: 90 tablet, Rfl: 3  •  cholecalciferol (VITAMIN D3) 1000 UNITS tablet, Take 1,000 Units by mouth Daily., Disp: , Rfl:   •  diphenhydrAMINE-acetaminophen (TYLENOL PM)  MG tablet per tablet, Take 2 tablets by mouth At Night As Needed for Sleep., Disp: , Rfl:   •  hydroCHLOROthiazide (HYDRODIURIL) 25 MG tablet, Take 1 tablet by mouth Daily., Disp: , Rfl:   •  lisinopril (PRINIVIL,ZESTRIL) 40 MG tablet, Take  by mouth Daily., Disp: , Rfl:   •  multivitamin with minerals (PRESERVISION AREDS PO), Take 1 tablet by mouth Daily., Disp: , Rfl:   •  omeprazole (priLOSEC) 20 MG capsule, Take 20 mg by mouth Daily., Disp: , Rfl:       History of Present Illness   Letitia Salazar is a 75 y.o. year old female here for follow up.  Today the patient is following up after recent evaluation in ER for chest pain, palpitations, and shortness of breath.  During that visit, the patient  "was evaluated and was not having acute cardiopulmonary event.  She was then seen after the ER visit by Chicho Drew for which she was sent for an echo and stress test.  The echo elucidated that she has normal pump function of her heart.  The stress test suggested that she had moderate diffuse coronary calcifications.  Upon further interrogation, the patient states that she has significant smoking history of 40 years for which she quit 9 years ago.  She denies any family history of coronary artery disease, heart failure, and cardiomyopathy. However, she does report that her sister and her  are patients of Dr. Bryant's and her sister has atrial fibrillation.  Further interrogation revealed that the patient believes that she may be in a prediabetic range of A1c and that she has a history of hyperlipidemia.    Her in-house blood pressure is 144/72. She states that her blood pressures are not usually that high but she does not report frequent monitoring of her blood pressure.  She also states that she is anxious when she comes into the doctor's office.    Currently the patient denies any cardiac symptoms such as chest pain, dyspnea, orthopnea, PND, palpitations, lower extremity edema, or claudication.      OBJECTIVE:  Vitals:    07/02/21 1527   BP: 144/72   BP Location: Right arm   Patient Position: Sitting   Pulse: 105   SpO2: 96%   Weight: 94.1 kg (207 lb 6.4 oz)   Height: 165.1 cm (65\")     Body mass index is 34.51 kg/m².    Vitals reviewed.   Constitutional:       General: Awake.      Appearance: Healthy appearance. Well-developed and not in distress. Obese.   Neck:      Thyroid: No thyromegaly.      Vascular: No carotid bruit.      Trachea: No tracheal deviation.   Pulmonary:      Effort: Pulmonary effort is normal. No respiratory distress.      Breath sounds: Normal breath sounds. No wheezing. No rales.   Cardiovascular:      Normal rate. Regular rhythm. Normal S1. Normal S2.      Murmurs: There is no " murmur.      No gallop. No and midsystolic click click. No rub.   Edema:     Peripheral edema absent.   Abdominal:      General: Bowel sounds are normal.      Palpations: Abdomen is soft. There is no abdominal mass.      Tenderness: There is no abdominal tenderness. There is no guarding.   Musculoskeletal:      Cervical back: Normal range of motion and neck supple. Skin:     General: Skin is warm and dry.   Neurological:      Mental Status: Alert and oriented to person, place, and time.   Psychiatric:         Attention and Perception: Attention normal.         Mood and Affect: Mood normal.         Speech: Speech normal.         Behavior: Behavior normal.         Diagnostic Data:  Procedures      ASSESSMENT:   Diagnosis Plan   1. Coronary artery disease involving native coronary artery of native heart without angina pectoris     2. Mixed hyperlipidemia     3. Chest pain, atypical     4. Essential hypertension         PLAN:  1.  CAD-patient not reporting any anginal symptoms no nor anginal equivalents in this visit.  CAD in context of normal echo and stress test that does not require acute intervention.  2.  Last labs in 2017 indicate patient needs to be placed on lipid-lowering medication.  Prescribed atorvastatin 20 mg.  3.  Blood pressure elevated in office today defer to PCP.      Scribe: Arsalan Bryant MD, FACC

## 2021-10-04 ENCOUNTER — TRANSCRIBE ORDERS (OUTPATIENT)
Dept: ADMINISTRATIVE | Facility: HOSPITAL | Age: 76
End: 2021-10-04

## 2021-10-04 DIAGNOSIS — Z12.31 VISIT FOR SCREENING MAMMOGRAM: Primary | ICD-10-CM

## 2022-01-28 RX ORDER — ANASTROZOLE 1 MG/1
1 TABLET ORAL DAILY
Qty: 90 TABLET | Refills: 1 | Status: SHIPPED | OUTPATIENT
Start: 2022-01-28

## 2022-01-28 NOTE — TELEPHONE ENCOUNTER
Anastrozole 1 mg tablet needs to be refilled patient states. Advised pt, I would leave a message for the nurse.

## 2022-05-10 ENCOUNTER — OFFICE VISIT (OUTPATIENT)
Dept: FAMILY MEDICINE CLINIC | Facility: CLINIC | Age: 77
End: 2022-05-10

## 2022-05-10 VITALS
HEIGHT: 65 IN | BODY MASS INDEX: 34.49 KG/M2 | OXYGEN SATURATION: 99 % | SYSTOLIC BLOOD PRESSURE: 158 MMHG | WEIGHT: 207 LBS | DIASTOLIC BLOOD PRESSURE: 92 MMHG | HEART RATE: 100 BPM

## 2022-05-10 DIAGNOSIS — I10 ESSENTIAL HYPERTENSION: Primary | ICD-10-CM

## 2022-05-10 DIAGNOSIS — E78.2 MIXED HYPERLIPIDEMIA: ICD-10-CM

## 2022-05-10 DIAGNOSIS — L57.0 ACTINIC KERATOSIS: ICD-10-CM

## 2022-05-10 DIAGNOSIS — K21.9 GASTROESOPHAGEAL REFLUX DISEASE WITHOUT ESOPHAGITIS: ICD-10-CM

## 2022-05-10 DIAGNOSIS — B35.9 DERMATOPHYTOSIS: ICD-10-CM

## 2022-05-10 DIAGNOSIS — J06.9 ACUTE URI: ICD-10-CM

## 2022-05-10 PROCEDURE — 99214 OFFICE O/P EST MOD 30 MIN: CPT | Performed by: FAMILY MEDICINE

## 2022-05-10 PROCEDURE — 17000 DESTRUCT PREMALG LESION: CPT | Performed by: FAMILY MEDICINE

## 2022-05-10 PROCEDURE — 36415 COLL VENOUS BLD VENIPUNCTURE: CPT | Performed by: FAMILY MEDICINE

## 2022-05-10 RX ORDER — CETIRIZINE HYDROCHLORIDE 10 MG/1
10 TABLET ORAL DAILY
Qty: 30 TABLET | Refills: 2 | Status: SHIPPED | OUTPATIENT
Start: 2022-05-10 | End: 2023-01-06

## 2022-05-10 RX ORDER — ATORVASTATIN CALCIUM 20 MG/1
20 TABLET, FILM COATED ORAL DAILY
Qty: 90 TABLET | Refills: 1 | Status: SHIPPED | OUTPATIENT
Start: 2022-05-10 | End: 2023-01-06 | Stop reason: SDUPTHER

## 2022-05-10 RX ORDER — HYDROCHLOROTHIAZIDE 25 MG/1
25 TABLET ORAL DAILY
Qty: 90 TABLET | Refills: 1 | Status: SHIPPED | OUTPATIENT
Start: 2022-05-10 | End: 2022-12-01

## 2022-05-10 RX ORDER — TERBINAFINE HYDROCHLORIDE 250 MG/1
250 TABLET ORAL DAILY
Qty: 90 TABLET | Refills: 0 | Status: SHIPPED | OUTPATIENT
Start: 2022-05-10 | End: 2023-01-06

## 2022-05-10 RX ORDER — OMEPRAZOLE 20 MG/1
20 CAPSULE, DELAYED RELEASE ORAL DAILY
Qty: 90 CAPSULE | Refills: 1 | Status: SHIPPED | OUTPATIENT
Start: 2022-05-10 | End: 2022-12-01

## 2022-05-10 RX ORDER — MUPIROCIN CALCIUM 20 MG/G
1 CREAM TOPICAL 3 TIMES DAILY
Qty: 30 G | Refills: 0 | Status: SHIPPED | OUTPATIENT
Start: 2022-05-10 | End: 2023-01-06

## 2022-05-11 PROBLEM — E78.2 MIXED HYPERLIPIDEMIA: Status: ACTIVE | Noted: 2022-05-11

## 2022-05-11 PROBLEM — K21.9 GASTROESOPHAGEAL REFLUX DISEASE WITHOUT ESOPHAGITIS: Status: ACTIVE | Noted: 2022-05-11

## 2022-05-11 LAB
ALBUMIN SERPL-MCNC: 4.6 G/DL (ref 3.7–4.7)
ALBUMIN/GLOB SERPL: 1.7 {RATIO} (ref 1.2–2.2)
ALP SERPL-CCNC: 112 IU/L (ref 44–121)
ALT SERPL-CCNC: 24 IU/L (ref 0–32)
AST SERPL-CCNC: 18 IU/L (ref 0–40)
BASOPHILS # BLD AUTO: 0.1 X10E3/UL (ref 0–0.2)
BASOPHILS NFR BLD AUTO: 1 %
BILIRUB SERPL-MCNC: 0.4 MG/DL (ref 0–1.2)
BUN SERPL-MCNC: 16 MG/DL (ref 8–27)
BUN/CREAT SERPL: 16 (ref 12–28)
CALCIUM SERPL-MCNC: 9.8 MG/DL (ref 8.7–10.3)
CHLORIDE SERPL-SCNC: 101 MMOL/L (ref 96–106)
CHOLEST SERPL-MCNC: 160 MG/DL (ref 100–199)
CO2 SERPL-SCNC: 23 MMOL/L (ref 20–29)
CREAT SERPL-MCNC: 1 MG/DL (ref 0.57–1)
EGFRCR SERPLBLD CKD-EPI 2021: 58 ML/MIN/1.73
EOSINOPHIL # BLD AUTO: 0.1 X10E3/UL (ref 0–0.4)
EOSINOPHIL NFR BLD AUTO: 1 %
ERYTHROCYTE [DISTWIDTH] IN BLOOD BY AUTOMATED COUNT: 13.6 % (ref 11.7–15.4)
GLOBULIN SER CALC-MCNC: 2.7 G/DL (ref 1.5–4.5)
GLUCOSE SERPL-MCNC: 138 MG/DL (ref 65–99)
HCT VFR BLD AUTO: 38.2 % (ref 34–46.6)
HDLC SERPL-MCNC: 32 MG/DL
HGB BLD-MCNC: 12.7 G/DL (ref 11.1–15.9)
IMM GRANULOCYTES # BLD AUTO: 0.1 X10E3/UL (ref 0–0.1)
IMM GRANULOCYTES NFR BLD AUTO: 1 %
LDLC SERPL CALC-MCNC: 98 MG/DL (ref 0–99)
LYMPHOCYTES # BLD AUTO: 1.7 X10E3/UL (ref 0.7–3.1)
LYMPHOCYTES NFR BLD AUTO: 15 %
MCH RBC QN AUTO: 29.3 PG (ref 26.6–33)
MCHC RBC AUTO-ENTMCNC: 33.2 G/DL (ref 31.5–35.7)
MCV RBC AUTO: 88 FL (ref 79–97)
MONOCYTES # BLD AUTO: 0.7 X10E3/UL (ref 0.1–0.9)
MONOCYTES NFR BLD AUTO: 6 %
NEUTROPHILS # BLD AUTO: 8.8 X10E3/UL (ref 1.4–7)
NEUTROPHILS NFR BLD AUTO: 76 %
PLATELET # BLD AUTO: 339 X10E3/UL (ref 150–450)
POTASSIUM SERPL-SCNC: 5 MMOL/L (ref 3.5–5.2)
PROT SERPL-MCNC: 7.3 G/DL (ref 6–8.5)
RBC # BLD AUTO: 4.34 X10E6/UL (ref 3.77–5.28)
SODIUM SERPL-SCNC: 141 MMOL/L (ref 134–144)
TRIGL SERPL-MCNC: 174 MG/DL (ref 0–149)
TSH SERPL DL<=0.005 MIU/L-ACNC: 1.56 UIU/ML (ref 0.45–4.5)
VLDLC SERPL CALC-MCNC: 30 MG/DL (ref 5–40)
WBC # BLD AUTO: 11.4 X10E3/UL (ref 3.4–10.8)

## 2022-05-11 NOTE — PROGRESS NOTES
Follow Up Office Visit      Date of Visit:  05/10/2022   Patient Name: Letitia Salazar  : 1945   MRN: [id number]     Chief Complaint:    Chief Complaint   Patient presents with   • Med Refill   • Sore Throat   • Skin Lesion     On her chest       History of Present Illness: Letitia Salazar is a 76 y.o. female who is here today for follow up.  Patient seen for chronic medical conditions.  Patient seen for her hypertension.  Blood pressure is elevated in the office today.  Patient has not been checking blood pressure at home.  No current symptoms of hypertension.  Patient also with hyperlipidemia.  Patient is due for blood work today.  Currently taking medications as prescribed.  She also currently has some URI symptoms with cough and congestion.  Patient also has a toenail fungus in one of her great toes.  She has not been treated for this.  She also has an actinic keratosis on her chest that she wants cryotherapy done today on.        Subjective      Review of Systems:   Review of Systems   Constitutional: Negative for fatigue and fever.   HENT: Negative for congestion and ear pain.    Respiratory: Negative for apnea, cough, chest tightness and shortness of breath.    Cardiovascular: Negative for chest pain.   Gastrointestinal: Negative for abdominal pain, constipation, diarrhea and nausea.   Musculoskeletal: Negative for arthralgias.   Psychiatric/Behavioral: Negative for depressed mood and stress.       Past Medical History:   Past Medical History:   Diagnosis Date   • AK (actinic keratosis)    • Breast cancer (HCC) 2017    LT   • Breast cancer (HCC)     RT    • Cataract    • Chronic GERD    • Class 1 obesity due to excess calories with serious comorbidity and body mass index (BMI) of 34.0 to 34.9 in adult    • Colonoscopy refused    • Essential (primary) hypertension    • High risk medication use    • Hx of radiation therapy     RT   • Impacted cerumen of right ear    • Mixed  hyperlipidemia 02/25/2008       Past Surgical History:   Past Surgical History:   Procedure Laterality Date   • BREAST BIOPSY Right 2005   • BREAST BIOPSY Left 2017    u/s bx    • BREAST LUMPECTOMY Right 2005   • BREAST LUMPECTOMY Left 2017   • CATARACT EXTRACTION  2005   • OTHER SURGICAL HISTORY      sweatgland surgery       Family History:   Family History   Problem Relation Age of Onset   • Esophageal cancer Mother    • Thyroid cancer Mother    • Lung cancer Sister    • Hypertension Brother    • Hyperlipidemia Other    • Breast cancer Neg Hx    • Endometrial cancer Neg Hx    • Ovarian cancer Neg Hx        Social History:   Social History     Socioeconomic History   • Marital status:    Tobacco Use   • Smoking status: Former Smoker     Quit date: 2012     Years since quitting: 10.3   • Smokeless tobacco: Former User     Quit date: 2012   Vaping Use   • Vaping Use: Never used   Substance and Sexual Activity   • Alcohol use: Not Currently   • Drug use: Never   • Sexual activity: Defer       Medications:     Current Outpatient Medications:   •  anastrozole (ARIMIDEX) 1 MG tablet, Take 1 tablet by mouth Daily., Disp: 90 tablet, Rfl: 1  •  atorvastatin (LIPITOR) 20 MG tablet, Take 1 tablet by mouth Daily., Disp: 90 tablet, Rfl: 1  •  cholecalciferol (VITAMIN D3) 1000 UNITS tablet, Take 1,000 Units by mouth Daily., Disp: , Rfl:   •  diphenhydrAMINE-acetaminophen (TYLENOL PM)  MG tablet per tablet, Take 2 tablets by mouth At Night As Needed for Sleep., Disp: , Rfl:   •  hydroCHLOROthiazide (HYDRODIURIL) 25 MG tablet, Take 1 tablet by mouth Daily., Disp: 90 tablet, Rfl: 1  •  lisinopril (PRINIVIL,ZESTRIL) 40 MG tablet, Take  by mouth Daily., Disp: , Rfl:   •  omeprazole (priLOSEC) 20 MG capsule, Take 1 capsule by mouth Daily., Disp: 90 capsule, Rfl: 1  •  cetirizine (zyrTEC) 10 MG tablet, Take 1 tablet by mouth Daily., Disp: 30 tablet, Rfl: 2  •  mupirocin (Bactroban) 2 % cream, Apply 1 application topically  "to the appropriate area as directed 3 (Three) Times a Day., Disp: 30 g, Rfl: 0  •  terbinafine (lamiSIL) 250 MG tablet, Take 1 tablet by mouth Daily., Disp: 90 tablet, Rfl: 0    Allergies:   No Known Allergies    Objective     Physical Exam:  Vital Signs:   Vitals:    05/10/22 0959   BP: 158/92   BP Location: Left arm   Patient Position: Sitting   Cuff Size: Large Adult   Pulse: 100   SpO2: 99%   Weight: 93.9 kg (207 lb)   Height: 165.1 cm (65\")   PainSc: 0-No pain     Body mass index is 34.45 kg/m².     Physical Exam  Vitals and nursing note reviewed.   Constitutional:       General: She is not in acute distress.     Appearance: Normal appearance. She is not ill-appearing.   HENT:      Head: Normocephalic and atraumatic.      Right Ear: Tympanic membrane and ear canal normal.      Left Ear: Tympanic membrane and ear canal normal.      Nose: Nose normal.   Cardiovascular:      Rate and Rhythm: Normal rate and regular rhythm.      Heart sounds: Normal heart sounds.   Pulmonary:      Effort: Pulmonary effort is normal.      Breath sounds: Normal breath sounds.   Neurological:      Mental Status: She is alert and oriented to person, place, and time. Mental status is at baseline.   Psychiatric:         Mood and Affect: Mood normal.         Cryotherapy, Skin Lesion    Date/Time: 5/11/2022 6:26 AM  Performed by: Jeff Thompson MD  Authorized by: Jeff Thompson MD   Preparation: Patient was prepped and draped in the usual sterile fashion.  Local anesthesia used: no    Anesthesia:  Local anesthesia used: no    Sedation:  Patient sedated: no    Patient tolerance: patient tolerated the procedure well with no immediate complications  Comments: Lesion frozen on the chest with liquid nitrogen.          BMI is >= 30 and <= 34.9 (Class 1 obesity). The following options were offered after discussion: exercise counseling/recommendations and nutrition counseling/recommendations       Assessment / Plan      Assessment/Plan: "   Diagnoses and all orders for this visit:    1. Essential hypertension (Primary)  -     CBC Auto Differential; Future  -     Comprehensive Metabolic Panel; Future  -     Lipid Panel; Future  -     TSH; Future  -     CBC Auto Differential  -     Comprehensive Metabolic Panel  -     Lipid Panel  -     TSH    2. Mixed hyperlipidemia  -     Comprehensive Metabolic Panel; Future  -     Lipid Panel; Future  -     Comprehensive Metabolic Panel  -     Lipid Panel    3. Gastroesophageal reflux disease without esophagitis    4. Acute URI    5. Dermatophytosis    6. Actinic keratosis    Other orders  -     cetirizine (zyrTEC) 10 MG tablet; Take 1 tablet by mouth Daily.  Dispense: 30 tablet; Refill: 2  -     hydroCHLOROthiazide (HYDRODIURIL) 25 MG tablet; Take 1 tablet by mouth Daily.  Dispense: 90 tablet; Refill: 1  -     omeprazole (priLOSEC) 20 MG capsule; Take 1 capsule by mouth Daily.  Dispense: 90 capsule; Refill: 1  -     atorvastatin (LIPITOR) 20 MG tablet; Take 1 tablet by mouth Daily.  Dispense: 90 tablet; Refill: 1  -     terbinafine (lamiSIL) 250 MG tablet; Take 1 tablet by mouth Daily.  Dispense: 90 tablet; Refill: 0  -     mupirocin (Bactroban) 2 % cream; Apply 1 application topically to the appropriate area as directed 3 (Three) Times a Day.  Dispense: 30 g; Refill: 0  -     Cryotherapy, Skin Lesion         Medication refills given on her chronic medications.  Cheaper for her to have her Zyrtec by prescription.  Treat toenail fungus with terbinafine.  Cryotherapy done on her actinic keratosis.  Blood work for her chronic medical conditions.  Patient will start keeping blood pressure readings at home.    Follow Up:   Return in about 6 months (around 11/10/2022) for Annual Wellness-Provider.    Jeff Thompson  [facility] [place]

## 2022-05-19 ENCOUNTER — TELEPHONE (OUTPATIENT)
Dept: FAMILY MEDICINE CLINIC | Facility: CLINIC | Age: 77
End: 2022-05-19

## 2022-05-20 ENCOUNTER — HOSPITAL ENCOUNTER (OUTPATIENT)
Dept: MAMMOGRAPHY | Facility: HOSPITAL | Age: 77
Discharge: HOME | End: 2022-05-20
Admitting: ANESTHESIOLOGY

## 2022-05-20 DIAGNOSIS — Z12.31 VISIT FOR SCREENING MAMMOGRAM: ICD-10-CM

## 2022-05-20 PROCEDURE — 77063 BREAST TOMOSYNTHESIS BI: CPT

## 2022-05-20 PROCEDURE — 77067 SCR MAMMO BI INCL CAD: CPT | Performed by: RADIOLOGY

## 2022-05-20 PROCEDURE — 77063 BREAST TOMOSYNTHESIS BI: CPT | Performed by: RADIOLOGY

## 2022-05-20 PROCEDURE — 77067 SCR MAMMO BI INCL CAD: CPT

## 2022-05-23 NOTE — TELEPHONE ENCOUNTER
Nothing extremely significant on blood work.  Triglycerides could be a little bit better at 174.  Would like those under 150.  No issues with liver or kidney function.  Sugar was fine.  No major issues.

## 2022-05-26 ENCOUNTER — OFFICE VISIT (OUTPATIENT)
Dept: ONCOLOGY | Facility: CLINIC | Age: 77
End: 2022-05-26

## 2022-05-26 VITALS
HEART RATE: 94 BPM | SYSTOLIC BLOOD PRESSURE: 148 MMHG | TEMPERATURE: 98.4 F | WEIGHT: 203 LBS | DIASTOLIC BLOOD PRESSURE: 75 MMHG | BODY MASS INDEX: 33.82 KG/M2 | OXYGEN SATURATION: 96 % | HEIGHT: 65 IN | RESPIRATION RATE: 18 BRPM

## 2022-05-26 DIAGNOSIS — D05.12 DUCTAL CARCINOMA IN SITU (DCIS) OF LEFT BREAST: Primary | ICD-10-CM

## 2022-05-26 PROCEDURE — 99213 OFFICE O/P EST LOW 20 MIN: CPT | Performed by: NURSE PRACTITIONER

## 2022-05-26 NOTE — PROGRESS NOTES
CHIEF COMPLAINT: Breast cancer    Problem List:  Oncology/Hematology History Overview Note   1.  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  Area of involvement 0.5 cm, margins free, nuclear grade 1, estrogen receptor positive, progesteroneeceptor positive, stage pTis pNX, Stage 0.     2.  Past history of right breast stage IIa T2no MX hormone receptor positive HER-2/jairon negative breast cancer status post lumpectomy and sentinel node biopsy 2005 treated with radiation therapy and tamoxifen and tolerated tamoxifen well    3.  Hidradenitis suppurativa     Breast cancer (HCC)   4/27/2017 Biopsy    Left breast mass.     6/13/2017 Initial Diagnosis    Breast cancer     6/13/2017 Surgery    Surgery       Procedure:  Lumpectomy      Location:  Left breast      Completeness of resection:  No evidence of residual tumor       6/29/2017 -  Hormonal Therapy    Arimidex.         HISTORY OF PRESENT ILLNESS:  The patient is a 76 y.o. female, here for follow up on management of ER positive DCIS of the left breast currently on therapy with anastrozole with a history of previous right breast cancer 2005.  Letitia has been doing well since we saw her last with no new concerns.  Tolerating anastrozole with no unusual side effects.  She is up-to-date on mammography, Bilateral screening mammogram 5/20/2022 was negative, BI-RADS 2.  No new or concerning bony aches or pains.  She has chronic back pain unchanged over time related to arthritis.  She is up-to-date on routine health maintenance.    Past Medical History:   Diagnosis Date   • AK (actinic keratosis)    • Breast cancer (HCC) 2017    LT   • Breast cancer (HCC) 2005    RT    • Cataract    • Chronic GERD    • Class 1 obesity due to excess calories with serious comorbidity and body mass index (BMI) of 34.0 to 34.9 in adult    • Colonoscopy refused    • Essential  "(primary) hypertension    • High risk medication use    • Hx of radiation therapy 2005    RT   • Impacted cerumen of right ear    • Mixed hyperlipidemia 02/25/2008     Past Surgical History:   Procedure Laterality Date   • BREAST BIOPSY Right 2005   • BREAST BIOPSY Left 2017    u/s bx    • BREAST LUMPECTOMY Right 2005   • BREAST LUMPECTOMY Left 2017   • CATARACT EXTRACTION  2005   • OTHER SURGICAL HISTORY      sweatgland surgery       No Known Allergies    Family History and Social History reviewed and changed as necessary    REVIEW OF SYSTEM:   Chronic back pain    PHYSICAL EXAM:  Well-developed, well-nourished female in no distress  No cervical, supraclavicular or axillary nodes palpable on exam  Breast exam benign, no abnormal masses or nodules, skin is smooth with no dimpling.    Vitals:    05/26/22 1004   BP: 148/75   Pulse: 94   Resp: 18   Temp: 98.4 °F (36.9 °C)   SpO2: 96%   Weight: 92.1 kg (203 lb)   Height: 165.1 cm (65\")     Vitals:    05/26/22 1004   PainSc: 0-No pain          ECOG score: 0           Vitals reviewed.  Labs available in epic reviewed at time of visit    ECOG: (0) Fully Active - Able to Carry On All Pre-disease Performance Without Restriction    Lab Results   Component Value Date    HGB 12.7 05/10/2022    HCT 38.2 05/10/2022    MCV 88 05/10/2022     05/10/2022    WBC 11.4 (H) 05/10/2022    NEUTROABS 8.8 (H) 05/10/2022    LYMPHSABS 1.7 05/10/2022    MONOSABS 0.7 05/10/2022    EOSABS 0.1 05/10/2022    BASOSABS 0.1 05/10/2022       Lab Results   Component Value Date    GLUCOSE 138 (H) 05/10/2022    BUN 16 05/10/2022    CREATININE 1.00 05/10/2022     05/10/2022    K 5.0 05/10/2022     05/10/2022    CO2 23 05/10/2022    CALCIUM 9.8 05/10/2022    PROTEINTOT 6.8 05/13/2021    ALBUMIN 4.6 05/10/2022    BILITOT 0.4 05/10/2022    ALKPHOS 112 05/10/2022    AST 18 05/10/2022    ALT 24 05/10/2022             ASSESSMENT & PLAN:    1.  DCIS of the left breast status postlumpectomy June " 2017 currently on therapy with anastrozole: Letitia is doing well, no evidence of disease on clinical exam and no new worrisome symptoms.  She will complete 5 years therapy with anastrozole late June.  We discussed today that when she runs out of her current prescription she will be finished with anastrozole and requires no further refills.  She is up-to-date on mammography, annual screening mammogram on 5/20/2022 was negative, BI-RADS 2.  She will continue annual screening mammogram.  At this time we will discontinue routine oncological follow-up.  She understands we are available in the future if she has any concerns.  It has been a pleasure participating in the care of of Letitia.      I spent 20 minutes caring for Letitia on this date of service. This time includes time spent by me in the following activities: preparing for the visit, reviewing tests, performing a medically appropriate examination and/or evaluation and documenting information in the medical record.     Veda Doe, APRN    05/26/2022

## 2022-07-07 NOTE — PROGRESS NOTES
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    PROBLEM LIST:   1. Chest pain  a. ER visit 5/14/2021, Negative cardiac markers, Normal EKG  b. 6/25/2020 one 2D echo LVEF =56 to 60%.  LV diastolic function (grade 1) impaired relaxation.  c. 6/25/2021 Lexiscan MPS: Lexiscan WNL.  LVEF calculated =69%.  Moderate diffuse coronary calcifications.  2. SOB  3. HTN  4. Breast cancer, ductal carcinma  5. Tobacco abuse(Quit 2012)  6. HLD  1. 2017: , , HDL 37, .  2. 5/22 160/174/32/98  7. Moderate Obesity  8. GERD  9. Surgical  1. Breat Lumpectomy for Cancer      CC:   Chief Complaint   Patient presents with   • Coronary Artery Disease       Allergies  No Known Allergies    Current Medications    Current Outpatient Medications:   •  anastrozole (ARIMIDEX) 1 MG tablet, Take 1 tablet by mouth Daily., Disp: 90 tablet, Rfl: 1  •  atorvastatin (LIPITOR) 20 MG tablet, Take 1 tablet by mouth Daily., Disp: 90 tablet, Rfl: 1  •  cetirizine (zyrTEC) 10 MG tablet, Take 1 tablet by mouth Daily., Disp: 30 tablet, Rfl: 2  •  cholecalciferol (VITAMIN D3) 1000 UNITS tablet, Take 1,000 Units by mouth Daily., Disp: , Rfl:   •  diphenhydrAMINE-acetaminophen (TYLENOL PM)  MG tablet per tablet, Take 2 tablets by mouth At Night As Needed for Sleep., Disp: , Rfl:   •  hydroCHLOROthiazide (HYDRODIURIL) 25 MG tablet, Take 1 tablet by mouth Daily., Disp: 90 tablet, Rfl: 1  •  lisinopril (PRINIVIL,ZESTRIL) 40 MG tablet, Take  by mouth Daily., Disp: , Rfl:   •  mupirocin (Bactroban) 2 % cream, Apply 1 application topically to the appropriate area as directed 3 (Three) Times a Day., Disp: 30 g, Rfl: 0  •  omeprazole (priLOSEC) 20 MG capsule, Take 1 capsule by mouth  "Daily., Disp: 90 capsule, Rfl: 1  •  terbinafine (lamiSIL) 250 MG tablet, Take 1 tablet by mouth Daily., Disp: 90 tablet, Rfl: 0      History of Present Illness   Letitia Salazar is a 76 y.o. year old female here for follow up.    Pt denies any chest pain, dyspnea, dyspnea on exertion, orthopnea, PND, palpitations, lower extremity edema, or claudication.  Largely staying indoors with inclement heat      OBJECTIVE:  Vitals:    07/08/22 1109   BP: 138/72   BP Location: Left arm   Patient Position: Sitting   Pulse: 96   SpO2: 94%   Weight: 93.4 kg (206 lb)   Height: 165.1 cm (65\")     Body mass index is 34.28 kg/m².    Constitutional:       Appearance: Healthy appearance. Not in distress.   Neck:      Vascular: No JVR. JVD normal.   Pulmonary:      Effort: Pulmonary effort is normal.      Breath sounds: Normal breath sounds. No wheezing. No rhonchi. No rales.   Chest:      Chest wall: Not tender to palpatation.   Cardiovascular:      PMI at left midclavicular line. Normal rate. Regular rhythm. Normal S1. Normal S2.      Murmurs: There is a systolic murmur.      No gallop. No click. No rub.   Pulses:     Intact distal pulses.   Edema:     Peripheral edema absent.   Abdominal:      General: Bowel sounds are normal.      Palpations: Abdomen is soft.      Tenderness: There is no abdominal tenderness.   Musculoskeletal: Normal range of motion.         General: No tenderness. Skin:     General: Skin is warm and dry.   Neurological:      General: No focal deficit present.      Mental Status: Alert and oriented to person, place and time.         Diagnostic Data:  Procedures      ASSESSMENT:   Diagnosis Plan   1. Coronary artery disease involving native coronary artery of native heart without angina pectoris     2. Primary hypertension     3. Mixed hyperlipidemia         PLAN:  CAD as manifestation chronic calcification continue GDMT    Hypertension controlled current lisinopril HCTZ    Mixed dyslipidemia controlled on statin " therapy          Fercho Bryant MD, FACC

## 2022-07-08 ENCOUNTER — OFFICE VISIT (OUTPATIENT)
Dept: CARDIOLOGY | Facility: CLINIC | Age: 77
End: 2022-07-08

## 2022-07-08 VITALS
WEIGHT: 206 LBS | DIASTOLIC BLOOD PRESSURE: 72 MMHG | BODY MASS INDEX: 34.32 KG/M2 | HEIGHT: 65 IN | HEART RATE: 96 BPM | OXYGEN SATURATION: 94 % | SYSTOLIC BLOOD PRESSURE: 138 MMHG

## 2022-07-08 DIAGNOSIS — I10 PRIMARY HYPERTENSION: ICD-10-CM

## 2022-07-08 DIAGNOSIS — E78.2 MIXED HYPERLIPIDEMIA: ICD-10-CM

## 2022-07-08 DIAGNOSIS — I25.10 CORONARY ARTERY DISEASE INVOLVING NATIVE CORONARY ARTERY OF NATIVE HEART WITHOUT ANGINA PECTORIS: Primary | ICD-10-CM

## 2022-07-08 PROCEDURE — 99214 OFFICE O/P EST MOD 30 MIN: CPT | Performed by: INTERNAL MEDICINE

## 2022-09-01 RX ORDER — LISINOPRIL 40 MG/1
TABLET ORAL
Qty: 30 TABLET | Refills: 3 | Status: SHIPPED | OUTPATIENT
Start: 2022-09-01 | End: 2022-12-03

## 2022-12-01 RX ORDER — HYDROCHLOROTHIAZIDE 25 MG/1
TABLET ORAL
Qty: 90 TABLET | Refills: 0 | Status: SHIPPED | OUTPATIENT
Start: 2022-12-01 | End: 2023-01-06 | Stop reason: SDUPTHER

## 2022-12-01 RX ORDER — OMEPRAZOLE 20 MG/1
CAPSULE, DELAYED RELEASE ORAL
Qty: 90 CAPSULE | Refills: 0 | Status: SHIPPED | OUTPATIENT
Start: 2022-12-01 | End: 2023-01-06 | Stop reason: SDUPTHER

## 2022-12-03 ENCOUNTER — TELEPHONE (OUTPATIENT)
Dept: FAMILY MEDICINE CLINIC | Facility: CLINIC | Age: 77
End: 2022-12-03

## 2022-12-03 RX ORDER — LISINOPRIL 40 MG/1
TABLET ORAL
Qty: 30 TABLET | Refills: 1 | Status: SHIPPED | OUTPATIENT
Start: 2022-12-03 | End: 2023-01-06 | Stop reason: SDUPTHER

## 2023-01-06 ENCOUNTER — OFFICE VISIT (OUTPATIENT)
Dept: FAMILY MEDICINE CLINIC | Facility: CLINIC | Age: 78
End: 2023-01-06
Payer: MEDICARE

## 2023-01-06 VITALS
OXYGEN SATURATION: 100 % | WEIGHT: 205 LBS | HEART RATE: 94 BPM | DIASTOLIC BLOOD PRESSURE: 90 MMHG | BODY MASS INDEX: 34.16 KG/M2 | SYSTOLIC BLOOD PRESSURE: 198 MMHG | HEIGHT: 65 IN

## 2023-01-06 DIAGNOSIS — E78.2 MIXED HYPERLIPIDEMIA: ICD-10-CM

## 2023-01-06 DIAGNOSIS — R35.0 URINARY FREQUENCY: ICD-10-CM

## 2023-01-06 DIAGNOSIS — I10 ESSENTIAL HYPERTENSION: ICD-10-CM

## 2023-01-06 DIAGNOSIS — L98.1 NEUROTIC EXCORIATIONS: ICD-10-CM

## 2023-01-06 DIAGNOSIS — R73.9 HYPERGLYCEMIA: Primary | ICD-10-CM

## 2023-01-06 LAB
BILIRUB BLD-MCNC: NEGATIVE MG/DL
CLARITY, POC: CLEAR
COLOR UR: YELLOW
EXPIRATION DATE: ABNORMAL
EXPIRATION DATE: NORMAL
GLUCOSE UR STRIP-MCNC: NEGATIVE MG/DL
HBA1C MFR BLD: 6.7 %
KETONES UR QL: NEGATIVE
LEUKOCYTE EST, POC: ABNORMAL
Lab: ABNORMAL
Lab: NORMAL
NITRITE UR-MCNC: NEGATIVE MG/ML
PH UR: 5.5 [PH] (ref 5–8)
PROT UR STRIP-MCNC: ABNORMAL MG/DL
RBC # UR STRIP: NEGATIVE /UL
SP GR UR: 1.02 (ref 1–1.03)
UROBILINOGEN UR QL: NORMAL

## 2023-01-06 PROCEDURE — 3044F HG A1C LEVEL LT 7.0%: CPT | Performed by: FAMILY MEDICINE

## 2023-01-06 PROCEDURE — 99214 OFFICE O/P EST MOD 30 MIN: CPT | Performed by: FAMILY MEDICINE

## 2023-01-06 PROCEDURE — 83036 HEMOGLOBIN GLYCOSYLATED A1C: CPT | Performed by: FAMILY MEDICINE

## 2023-01-06 PROCEDURE — 36415 COLL VENOUS BLD VENIPUNCTURE: CPT | Performed by: FAMILY MEDICINE

## 2023-01-06 PROCEDURE — 81003 URINALYSIS AUTO W/O SCOPE: CPT | Performed by: FAMILY MEDICINE

## 2023-01-06 RX ORDER — HYDROCHLOROTHIAZIDE 25 MG/1
25 TABLET ORAL DAILY
Qty: 90 TABLET | Refills: 1 | Status: SHIPPED | OUTPATIENT
Start: 2023-01-06

## 2023-01-06 RX ORDER — OMEPRAZOLE 20 MG/1
20 CAPSULE, DELAYED RELEASE ORAL DAILY
Qty: 90 CAPSULE | Refills: 1 | Status: SHIPPED | OUTPATIENT
Start: 2023-01-06

## 2023-01-06 RX ORDER — LISINOPRIL 40 MG/1
40 TABLET ORAL DAILY
Qty: 90 TABLET | Refills: 1 | Status: SHIPPED | OUTPATIENT
Start: 2023-01-06

## 2023-01-06 RX ORDER — ATORVASTATIN CALCIUM 20 MG/1
20 TABLET, FILM COATED ORAL DAILY
Qty: 90 TABLET | Refills: 1 | Status: SHIPPED | OUTPATIENT
Start: 2023-01-06

## 2023-01-06 RX ORDER — HYDROXYZINE HYDROCHLORIDE 25 MG/1
25 TABLET, FILM COATED ORAL 2 TIMES DAILY PRN
Qty: 60 TABLET | Refills: 2 | Status: SHIPPED | OUTPATIENT
Start: 2023-01-06

## 2023-01-06 RX ORDER — AMLODIPINE BESYLATE 10 MG/1
10 TABLET ORAL DAILY
Qty: 30 TABLET | Refills: 5 | Status: SHIPPED | OUTPATIENT
Start: 2023-01-06

## 2023-01-06 NOTE — PROGRESS NOTES
Follow Up Office Visit      Date of Visit:  2023   Patient Name: Letitia Salazar  : 1945   MRN: [id number]     Chief Complaint:    Chief Complaint   Patient presents with   • Hypertension   • Urinary Frequency       History of Present Illness: Letitia Salazar is a 77 y.o. female who is here today for follow up.  Patient comes in today for multiple reasons.  She is due for blood work for her chronic medical conditions.  Hypertension is not well controlled.  Blood pressure extremely elevated today.  She is also been having some urinary frequency.  In looking at previous blood work her sugar has been somewhat elevated.  Urinalysis was negative in the office today.  She also wanted some medication for some scalp itching.  She has some neurotic dermatitis.        Subjective      Review of Systems:   Review of Systems   Constitutional: Negative for fatigue and fever.   HENT: Negative for congestion and ear pain.    Respiratory: Negative for apnea, cough, chest tightness and shortness of breath.    Cardiovascular: Negative for chest pain.   Gastrointestinal: Negative for abdominal pain, constipation, diarrhea and nausea.   Musculoskeletal: Negative for arthralgias.   Psychiatric/Behavioral: Negative for depressed mood and stress.       Past Medical History:   Past Medical History:   Diagnosis Date   • AK (actinic keratosis)    • Breast cancer (HCC) 2017    LT   • Breast cancer (HCC)     RT    • Cataract    • Chronic GERD    • Class 1 obesity due to excess calories with serious comorbidity and body mass index (BMI) of 34.0 to 34.9 in adult    • Colonoscopy refused    • Essential (primary) hypertension    • High risk medication use    • Hx of radiation therapy     RT   • Impacted cerumen of right ear    • Mixed hyperlipidemia 2008       Past Surgical History:   Past Surgical History:   Procedure Laterality Date   • BREAST BIOPSY Right    • BREAST BIOPSY Left     u/s bx    • BREAST  LUMPECTOMY Right    • BREAST LUMPECTOMY Left    • CATARACT EXTRACTION     • OTHER SURGICAL HISTORY      sweatgland surgery       Family History:   Family History   Problem Relation Age of Onset   • Esophageal cancer Mother    • Thyroid cancer Mother    • Breast cancer Sister 80   • Lung cancer Sister    • Hypertension Brother    • Hyperlipidemia Other    • Endometrial cancer Neg Hx    • Ovarian cancer Neg Hx        Social History:   Social History     Socioeconomic History   • Marital status:    Tobacco Use   • Smoking status: Former     Types: Cigarettes     Quit date:      Years since quittin.0   • Smokeless tobacco: Former     Quit date:    Vaping Use   • Vaping Use: Never used   Substance and Sexual Activity   • Alcohol use: Not Currently   • Drug use: Never   • Sexual activity: Defer       Medications:     Current Outpatient Medications:   •  atorvastatin (LIPITOR) 20 MG tablet, Take 1 tablet by mouth Daily., Disp: 90 tablet, Rfl: 1  •  cholecalciferol (VITAMIN D3) 1000 UNITS tablet, Take 1,000 Units by mouth Daily., Disp: , Rfl:   •  diphenhydrAMINE-acetaminophen (TYLENOL PM)  MG tablet per tablet, Take 2 tablets by mouth At Night As Needed for Sleep., Disp: , Rfl:   •  hydroCHLOROthiazide (HYDRODIURIL) 25 MG tablet, Take 1 tablet by mouth Daily., Disp: 90 tablet, Rfl: 1  •  lisinopril (PRINIVIL,ZESTRIL) 40 MG tablet, Take 1 tablet by mouth Daily., Disp: 90 tablet, Rfl: 1  •  omeprazole (priLOSEC) 20 MG capsule, Take 1 capsule by mouth Daily. Hold on file, Disp: 90 capsule, Rfl: 1  •  amLODIPine (Norvasc) 10 MG tablet, Take 1 tablet by mouth Daily., Disp: 30 tablet, Rfl: 5  •  anastrozole (ARIMIDEX) 1 MG tablet, Take 1 tablet by mouth Daily., Disp: 90 tablet, Rfl: 1  •  hydrOXYzine (ATARAX) 25 MG tablet, Take 1 tablet by mouth 2 (Two) Times a Day As Needed for Itching., Disp: 60 tablet, Rfl: 2  •  mupirocin (BACTROBAN) 2 % ointment, Apply 1 application topically to the  appropriate area as directed 3 (Three) Times a Day., Disp: 30 g, Rfl: 1    Allergies:   No Known Allergies    Objective     Physical Exam:  Vital Signs:   Vitals:    01/06/23 0945   BP: (!) 198/90   Pulse: 94   SpO2: 100%   Weight: 93 kg (205 lb)   Height: 165.1 cm (65\")     Body mass index is 34.11 kg/m².     Physical Exam  Vitals and nursing note reviewed.   Constitutional:       General: She is not in acute distress.     Appearance: Normal appearance. She is not ill-appearing.   HENT:      Head: Normocephalic and atraumatic.      Right Ear: Tympanic membrane and ear canal normal.      Left Ear: Tympanic membrane and ear canal normal.      Nose: Nose normal.   Cardiovascular:      Rate and Rhythm: Normal rate and regular rhythm.      Heart sounds: Normal heart sounds.   Pulmonary:      Effort: Pulmonary effort is normal.      Breath sounds: Normal breath sounds.   Neurological:      Mental Status: She is alert and oriented to person, place, and time. Mental status is at baseline.   Psychiatric:         Mood and Affect: Mood normal.         Procedures      Assessment / Plan      Assessment/Plan:   Diagnoses and all orders for this visit:    1. Hyperglycemia (Primary)  -     Comprehensive Metabolic Panel; Future  -     POC Glycosylated Hemoglobin (Hb A1C)  -     Comprehensive Metabolic Panel    2. Urinary frequency  -     POCT urinalysis dipstick, automated  -     Urine Culture - Urine, Urine, Clean Catch    3. Essential hypertension  -     CBC Auto Differential; Future  -     Lipid Panel; Future  -     TSH; Future  -     CBC Auto Differential  -     Lipid Panel  -     TSH    4. Mixed hyperlipidemia  -     CBC Auto Differential; Future  -     Lipid Panel; Future  -     CBC Auto Differential  -     Lipid Panel    5. Neurotic excoriations    Other orders  -     atorvastatin (LIPITOR) 20 MG tablet; Take 1 tablet by mouth Daily.  Dispense: 90 tablet; Refill: 1  -     omeprazole (priLOSEC) 20 MG capsule; Take 1 capsule  by mouth Daily. Hold on file  Dispense: 90 capsule; Refill: 1  -     lisinopril (PRINIVIL,ZESTRIL) 40 MG tablet; Take 1 tablet by mouth Daily.  Dispense: 90 tablet; Refill: 1  -     hydroCHLOROthiazide (HYDRODIURIL) 25 MG tablet; Take 1 tablet by mouth Daily.  Dispense: 90 tablet; Refill: 1  -     mupirocin (BACTROBAN) 2 % ointment; Apply 1 application topically to the appropriate area as directed 3 (Three) Times a Day.  Dispense: 30 g; Refill: 1  -     hydrOXYzine (ATARAX) 25 MG tablet; Take 1 tablet by mouth 2 (Two) Times a Day As Needed for Itching.  Dispense: 60 tablet; Refill: 2  -     amLODIPine (Norvasc) 10 MG tablet; Take 1 tablet by mouth Daily.  Dispense: 30 tablet; Refill: 5         Added Norvasc to her blood pressure regimen.  Keep blood pressure readings.  Check appropriate blood work.  Gave hydroxyzine for the neurotic excoriations.  Checking sugar with A1c today revealed an A1c of 6.7.  I think her urinary symptoms could be just due to the fact that her sugar is somewhat high.  Starting to watch diet.  Refilled appropriate medications.    Follow Up:   No follow-ups on file.    Jeff Thompson  [facility] [place]

## 2023-01-07 LAB
ALBUMIN SERPL-MCNC: 4.6 G/DL (ref 3.7–4.7)
ALBUMIN/GLOB SERPL: 1.7 {RATIO} (ref 1.2–2.2)
ALP SERPL-CCNC: 107 IU/L (ref 44–121)
ALT SERPL-CCNC: 22 IU/L (ref 0–32)
AST SERPL-CCNC: 22 IU/L (ref 0–40)
BASOPHILS # BLD AUTO: 0.1 X10E3/UL (ref 0–0.2)
BASOPHILS NFR BLD AUTO: 1 %
BILIRUB SERPL-MCNC: 0.3 MG/DL (ref 0–1.2)
BUN SERPL-MCNC: 17 MG/DL (ref 8–27)
BUN/CREAT SERPL: 16 (ref 12–28)
CALCIUM SERPL-MCNC: 9.4 MG/DL (ref 8.7–10.3)
CHLORIDE SERPL-SCNC: 102 MMOL/L (ref 96–106)
CHOLEST SERPL-MCNC: 153 MG/DL (ref 100–199)
CO2 SERPL-SCNC: 24 MMOL/L (ref 20–29)
CREAT SERPL-MCNC: 1.07 MG/DL (ref 0.57–1)
EGFRCR SERPLBLD CKD-EPI 2021: 53 ML/MIN/1.73
EOSINOPHIL # BLD AUTO: 0.2 X10E3/UL (ref 0–0.4)
EOSINOPHIL NFR BLD AUTO: 2 %
ERYTHROCYTE [DISTWIDTH] IN BLOOD BY AUTOMATED COUNT: 13.8 % (ref 11.7–15.4)
GLOBULIN SER CALC-MCNC: 2.7 G/DL (ref 1.5–4.5)
GLUCOSE SERPL-MCNC: 135 MG/DL (ref 70–99)
HCT VFR BLD AUTO: 37.8 % (ref 34–46.6)
HDLC SERPL-MCNC: 31 MG/DL
HGB BLD-MCNC: 12.2 G/DL (ref 11.1–15.9)
IMM GRANULOCYTES # BLD AUTO: 0 X10E3/UL (ref 0–0.1)
IMM GRANULOCYTES NFR BLD AUTO: 1 %
LDLC SERPL CALC-MCNC: 87 MG/DL (ref 0–99)
LYMPHOCYTES # BLD AUTO: 2.2 X10E3/UL (ref 0.7–3.1)
LYMPHOCYTES NFR BLD AUTO: 25 %
MCH RBC QN AUTO: 28.2 PG (ref 26.6–33)
MCHC RBC AUTO-ENTMCNC: 32.3 G/DL (ref 31.5–35.7)
MCV RBC AUTO: 87 FL (ref 79–97)
MONOCYTES # BLD AUTO: 0.6 X10E3/UL (ref 0.1–0.9)
MONOCYTES NFR BLD AUTO: 6 %
NEUTROPHILS # BLD AUTO: 5.8 X10E3/UL (ref 1.4–7)
NEUTROPHILS NFR BLD AUTO: 65 %
PLATELET # BLD AUTO: 383 X10E3/UL (ref 150–450)
POTASSIUM SERPL-SCNC: 4.5 MMOL/L (ref 3.5–5.2)
PROT SERPL-MCNC: 7.3 G/DL (ref 6–8.5)
RBC # BLD AUTO: 4.33 X10E6/UL (ref 3.77–5.28)
SODIUM SERPL-SCNC: 143 MMOL/L (ref 134–144)
TRIGL SERPL-MCNC: 208 MG/DL (ref 0–149)
TSH SERPL DL<=0.005 MIU/L-ACNC: 1.39 UIU/ML (ref 0.45–4.5)
VLDLC SERPL CALC-MCNC: 35 MG/DL (ref 5–40)
WBC # BLD AUTO: 8.7 X10E3/UL (ref 3.4–10.8)

## 2023-01-10 ENCOUNTER — TRANSCRIBE ORDERS (OUTPATIENT)
Dept: FAMILY MEDICINE CLINIC | Facility: CLINIC | Age: 78
End: 2023-01-10
Payer: MEDICARE

## 2023-01-10 ENCOUNTER — TRANSCRIBE ORDERS (OUTPATIENT)
Dept: ADMINISTRATIVE | Facility: HOSPITAL | Age: 78
End: 2023-01-10
Payer: MEDICARE

## 2023-01-10 DIAGNOSIS — Z12.31 VISIT FOR SCREENING MAMMOGRAM: Primary | ICD-10-CM

## 2023-01-12 LAB
BACTERIA UR CULT: ABNORMAL
BACTERIA UR CULT: ABNORMAL
OTHER ANTIBIOTIC SUSC ISLT: ABNORMAL

## 2023-01-16 RX ORDER — CIPROFLOXACIN 500 MG/1
500 TABLET, FILM COATED ORAL 2 TIMES DAILY
Qty: 14 TABLET | Refills: 0 | Status: SHIPPED | OUTPATIENT
Start: 2023-01-16

## 2023-01-20 ENCOUNTER — TELEPHONE (OUTPATIENT)
Dept: FAMILY MEDICINE CLINIC | Facility: CLINIC | Age: 78
End: 2023-01-20
Payer: MEDICARE

## 2023-01-20 NOTE — LETTER
January 27, 2023      Letitia Salazar    [address] [place] [address]        Dear Ms. Salazar    Below are the results from your recent visit. Your labs looked stable with no significant changes. Please continue your regular medications.     Office Visit on 01/06/2023   Component Date Value Ref Range Status   • Hemoglobin A1C 01/06/2023 6.7  % Final   • Lot Number 01/06/2023 [id number]   Final   • Expiration Date 01/06/2023 [id number]   Final   • Color 01/06/2023 Yellow  Yellow, Straw, Dark Yellow, Adeline Final   • Clarity, UA 01/06/2023 Clear  Clear Final   • Specific Gravity  01/06/2023 1.025  1.005 - 1.030 Final   • pH, Urine 01/06/2023 5.5  5.0 - 8.0 Final   • Leukocytes 01/06/2023 Trace (A)  Negative Final   • Nitrite, UA 01/06/2023 Negative  Negative Final   • Protein, POC 01/06/2023 Trace (A)  Negative mg/dL Final   • Glucose, UA 01/06/2023 Negative  Negative mg/dL Final   • Ketones, UA 01/06/2023 Negative  Negative Final   • Urobilinogen, UA 01/06/2023 Normal  Normal, 0.2 E.U./dL Final   • Bilirubin 01/06/2023 Negative  Negative Final   • Blood, UA 01/06/2023 Negative  Negative Final   • Lot Number 01/06/2023 [id number]   Final   • Expiration Date 01/06/2023 [id number]   Final   • Urine Culture 01/06/2023 Final report (A)   Final   • Result 1 01/06/2023 Comment (A)   Final    Escherichia coli, identified by an automated biochemical system.  Cefazolin <=4 ug/mL  Cefazolin with an ALLEN <=16 predicts susceptibility to the oral agents  cefaclor, cefdinir, cefpodoxime, cefprozil, cefuroxime, cephalexin,  and loracarbef when used for therapy of uncomplicated urinary tract  infections due to E. coli, Klebsiella pneumoniae, and Proteus  mirabilis.  10,000-25,000 colony forming units per mL   • Susceptibility Testing 01/06/2023 Comment   Final          ** S = Susceptible; I = Intermediate; R = Resistant **                     P = Positive; N = Negative              MICS are expressed in micrograms per mL      Antibiotic                 RSLT#1    RSLT#2    RSLT#3    RSLT#4  Amoxicillin/Clavulanic Acid    S  Ampicillin                     S  Cefepime                       S  Ceftriaxone                    S  Cefuroxime                     S  Ciprofloxacin                  S  Ertapenem                      S  Gentamicin                     S  Imipenem                       S  Levofloxacin                   S  Meropenem                      S  Nitrofurantoin                 S  Piperacillin/Tazobactam        S  Tetracycline                   S  Tobramycin                     S  Trimethoprim/Sulfa             S   • Glucose 01/06/2023 135 (H)  70 - 99 mg/dL Final   • BUN 01/06/2023 17  8 - 27 mg/dL Final   • Creatinine 01/06/2023 1.07 (H)  0.57 - 1.00 mg/dL Final   • EGFR Result 01/06/2023 53 (L)  >59 mL/min/1.73 Final   • BUN/Creatinine Ratio 01/06/2023 16  12 - 28 Final   • Sodium 01/06/2023 143  134 - 144 mmol/L Final   • Potassium 01/06/2023 4.5  3.5 - 5.2 mmol/L Final   • Chloride 01/06/2023 102  96 - 106 mmol/L Final   • Total CO2 01/06/2023 24  20 - 29 mmol/L Final   • Calcium 01/06/2023 9.4  8.7 - 10.3 mg/dL Final   • Total Protein 01/06/2023 7.3  6.0 - 8.5 g/dL Final   • Albumin 01/06/2023 4.6  3.7 - 4.7 g/dL Final   • Globulin 01/06/2023 2.7  1.5 - 4.5 g/dL Final   • A/G Ratio 01/06/2023 1.7  1.2 - 2.2 Final   • Total Bilirubin 01/06/2023 0.3  0.0 - 1.2 mg/dL Final   • Alkaline Phosphatase 01/06/2023 107  44 - 121 IU/L Final   • AST (SGOT) 01/06/2023 22  0 - 40 IU/L Final   • ALT (SGPT) 01/06/2023 22  0 - 32 IU/L Final   • WBC 01/06/2023 8.7  3.4 - 10.8 x10E3/uL Final   • RBC 01/06/2023 4.33  3.77 - 5.28 x10E6/uL Final   • Hemoglobin 01/06/2023 12.2  11.1 - 15.9 g/dL Final   • Hematocrit 01/06/2023 37.8  34.0 - 46.6 % Final   • MCV 01/06/2023 87  79 - 97 fL Final   • MCH 01/06/2023 28.2  26.6 - 33.0 pg Final   • MCHC 01/06/2023 32.3  31.5 - 35.7 g/dL Final   • RDW 01/06/2023 13.8  11.7 - 15.4 % Final   • Platelets  01/06/2023 383  150 - 450 x10E3/uL Final   • Neutrophil Rel % 01/06/2023 65  Not Estab. % Final   • Lymphocyte Rel % 01/06/2023 25  Not Estab. % Final   • Monocyte Rel % 01/06/2023 6  Not Estab. % Final   • Eosinophil Rel % 01/06/2023 2  Not Estab. % Final   • Basophil Rel % 01/06/2023 1  Not Estab. % Final   • Neutrophils Absolute 01/06/2023 5.8  1.4 - 7.0 x10E3/uL Final   • Lymphocytes Absolute 01/06/2023 2.2  0.7 - 3.1 x10E3/uL Final   • Monocytes Absolute 01/06/2023 0.6  0.1 - 0.9 x10E3/uL Final   • Eosinophils Absolute 01/06/2023 0.2  0.0 - 0.4 x10E3/uL Final   • Basophils Absolute 01/06/2023 0.1  0.0 - 0.2 x10E3/uL Final   • Immature Granulocyte Rel % 01/06/2023 1  Not Estab. % Final   • Immature Grans Absolute 01/06/2023 0.0  0.0 - 0.1 x10E3/uL Final   • Total Cholesterol 01/06/2023 153  100 - 199 mg/dL Final   • Triglycerides 01/06/2023 208 (H)  0 - 149 mg/dL Final   • HDL Cholesterol 01/06/2023 31 (L)  >39 mg/dL Final   • VLDL Cholesterol Ant 01/06/2023 35  5 - 40 mg/dL Final   • LDL Chol Calc ([facility]) 01/06/2023 87  0 - 99 mg/dL Final   • TSH 01/06/2023 1.390  0.450 - 4.500 uIU/mL Final                 Sincerely,      Jeff Thompson MD

## 2023-01-20 NOTE — TELEPHONE ENCOUNTER
PHONE CALL FROM PATIENT.  PLEASE MAIL HER LAST BLOOD WORK TO HER HOME ADDRESS.     CALL IF NEEDED

## 2023-01-25 NOTE — TELEPHONE ENCOUNTER
Please mail the patient a copy of her labs.  Please also include a small letter stating that everything was stable and there was no significant changes.  Continue her current medications.

## 2023-02-01 ENCOUNTER — TELEPHONE (OUTPATIENT)
Dept: FAMILY MEDICINE CLINIC | Facility: CLINIC | Age: 78
End: 2023-02-01

## 2023-02-01 NOTE — TELEPHONE ENCOUNTER
Caller: Letitia Salazar    Relationship: Self    Best call back number: [phone]    What form or medical record are you requesting: TEST RESULT FROM 1/6 AND ALSO LETTER STATING LABS ARE STABLE    How would you like to receive the form or medical records (pick-up, mail, fax):   [id number] [facility] [id number]    Additional notes: PT STATED THAT SHE REQUESTED LETTER AND REQUEST BUT HAS NOT RECEIVED ANYTHING JUST YET AND STATED THAT IT HAS BEEN OVER A WEEK.

## 2023-02-01 NOTE — TELEPHONE ENCOUNTER
Caller: Letitia Salazar    Relationship: Self    Best call back number:  [phone]       What was the call regarding: THE PATIENT STATES TO PLEASE DISREGARD THE PREVIOUS REQUEST FOR THE RESULTS OF HER LABS AND REPORTED THAT SHE RECEIVED THEM IN THE MAIL TODAY (02/01/2023).    Do you require a callback: NO

## 2023-05-24 ENCOUNTER — HOSPITAL ENCOUNTER (OUTPATIENT)
Dept: MAMMOGRAPHY | Facility: HOSPITAL | Age: 78
Discharge: HOME | End: 2023-05-24
Admitting: FAMILY MEDICINE
Payer: MEDICARE

## 2023-05-24 DIAGNOSIS — Z12.31 VISIT FOR SCREENING MAMMOGRAM: ICD-10-CM

## 2023-05-24 PROCEDURE — 77063 BREAST TOMOSYNTHESIS BI: CPT

## 2023-05-24 PROCEDURE — 77067 SCR MAMMO BI INCL CAD: CPT

## 2023-07-05 ENCOUNTER — TELEPHONE (OUTPATIENT)
Dept: FAMILY MEDICINE CLINIC | Facility: CLINIC | Age: 78
End: 2023-07-05
Payer: MEDICARE

## 2023-07-05 DIAGNOSIS — R73.9 HYPERGLYCEMIA: Primary | ICD-10-CM

## 2023-07-05 DIAGNOSIS — E78.2 MIXED HYPERLIPIDEMIA: ICD-10-CM

## 2023-07-05 DIAGNOSIS — I10 ESSENTIAL HYPERTENSION: ICD-10-CM

## 2023-07-21 PROBLEM — I25.10 CORONARY ARTERY CALCIFICATION: Status: ACTIVE | Noted: 2023-07-21

## 2023-07-21 PROBLEM — I25.84 CORONARY ARTERY CALCIFICATION: Status: ACTIVE | Noted: 2023-07-21

## 2023-07-26 ENCOUNTER — LAB (OUTPATIENT)
Dept: FAMILY MEDICINE CLINIC | Facility: CLINIC | Age: 78
End: 2023-07-26
Payer: MEDICARE

## 2023-08-11 ENCOUNTER — TELEPHONE (OUTPATIENT)
Dept: FAMILY MEDICINE CLINIC | Facility: CLINIC | Age: 78
End: 2023-08-11

## 2023-08-11 NOTE — TELEPHONE ENCOUNTER
"    Caller: Letitia Salazar \"Radha\"    Relationship to patient: Self    Best call back number: [phone]     Patient is needing: PATIENT REQUESTING TO  COPY OF LAB RESULTS FROM JULY 26.    STATED SHE WILL STOP BY OFFICE IN A BIT.        "

## 2023-08-17 ENCOUNTER — OFFICE VISIT (OUTPATIENT)
Dept: FAMILY MEDICINE CLINIC | Facility: CLINIC | Age: 78
End: 2023-08-17
Payer: MEDICARE

## 2023-08-17 VITALS
DIASTOLIC BLOOD PRESSURE: 88 MMHG | SYSTOLIC BLOOD PRESSURE: 160 MMHG | BODY MASS INDEX: 33.82 KG/M2 | OXYGEN SATURATION: 98 % | HEIGHT: 65 IN | HEART RATE: 102 BPM | WEIGHT: 203 LBS

## 2023-08-17 DIAGNOSIS — R73.9 HYPERGLYCEMIA: ICD-10-CM

## 2023-08-17 DIAGNOSIS — N63.0 BREAST NODULE: Primary | ICD-10-CM

## 2023-08-17 DIAGNOSIS — K21.9 GASTROESOPHAGEAL REFLUX DISEASE WITHOUT ESOPHAGITIS: ICD-10-CM

## 2023-08-17 DIAGNOSIS — E78.2 MIXED HYPERLIPIDEMIA: ICD-10-CM

## 2023-08-17 DIAGNOSIS — N63.11 UNSPECIFIED LUMP IN THE RIGHT BREAST, UPPER OUTER QUADRANT: ICD-10-CM

## 2023-08-17 DIAGNOSIS — B37.9 CANDIDIASIS: ICD-10-CM

## 2023-08-17 DIAGNOSIS — I10 ESSENTIAL HYPERTENSION: ICD-10-CM

## 2023-08-17 RX ORDER — FLUCONAZOLE 150 MG/1
150 TABLET ORAL ONCE
Qty: 1 TABLET | Refills: 0 | Status: SHIPPED | OUTPATIENT
Start: 2023-08-17 | End: 2023-08-17

## 2023-08-17 RX ORDER — LISINOPRIL 40 MG/1
40 TABLET ORAL DAILY
Qty: 90 TABLET | Refills: 3 | Status: SHIPPED | OUTPATIENT
Start: 2023-08-17

## 2023-08-17 RX ORDER — NYSTATIN 100000 U/G
1 CREAM TOPICAL 2 TIMES DAILY
Qty: 30 G | Refills: 1 | Status: SHIPPED | OUTPATIENT
Start: 2023-08-17

## 2023-08-17 RX ORDER — ATORVASTATIN CALCIUM 20 MG/1
20 TABLET, FILM COATED ORAL DAILY
Qty: 90 TABLET | Refills: 3 | Status: SHIPPED | OUTPATIENT
Start: 2023-08-17

## 2023-08-17 RX ORDER — HYDROCHLOROTHIAZIDE 25 MG/1
25 TABLET ORAL DAILY
Qty: 90 TABLET | Refills: 3 | Status: SHIPPED | OUTPATIENT
Start: 2023-08-17

## 2023-08-17 RX ORDER — AMLODIPINE BESYLATE 10 MG/1
10 TABLET ORAL DAILY
Qty: 90 TABLET | Refills: 3 | Status: SHIPPED | OUTPATIENT
Start: 2023-08-17

## 2023-08-17 RX ORDER — OMEPRAZOLE 20 MG/1
20 CAPSULE, DELAYED RELEASE ORAL DAILY
Qty: 90 CAPSULE | Refills: 3 | Status: SHIPPED | OUTPATIENT
Start: 2023-08-17

## 2023-08-17 NOTE — PROGRESS NOTES
Follow Up Office Visit      Date of Visit:  2023   Patient Name: Letitia Salazar  : 1945   MRN: [id number]     Chief Complaint:    Chief Complaint   Patient presents with    Hip Pain    rash under breast and arm    Breast Mass       History of Present Illness: Letitia Salazar is a 77 y.o. female who is here today for follow up.  Patient seen today for multiple issues.  First she has a palpable breadths nodule.  Last mammogram was just a few months ago and read negative.  Prior breast cancer.  Located in area of her previous scars.  Questionable scar tissue?  Patient also needs a recheck on her hypertension hyperlipidemia and hyperglycemia.  Medication refills given.  Reviewed previous blood work.  Scheduled future blood work.  Has candidiasis in her axilla and under the breast tissue.  Needs also refills on GERD medication.        Subjective      Review of Systems:   Review of Systems   Constitutional:  Negative for fatigue and fever.   HENT:  Negative for congestion and ear pain.    Respiratory:  Negative for apnea, cough, chest tightness and shortness of breath.    Cardiovascular:  Negative for chest pain.   Gastrointestinal:  Negative for abdominal pain, constipation, diarrhea and nausea.   Musculoskeletal:  Negative for arthralgias.   Skin:  Positive for rash.   Psychiatric/Behavioral:  Negative for depressed mood and stress.      Past Medical History:   Past Medical History:   Diagnosis Date    AK (actinic keratosis)     Breast cancer 2017    LT    Breast cancer     RT     Cataract     Chronic GERD     Class 1 obesity due to excess calories with serious comorbidity and body mass index (BMI) of 34.0 to 34.9 in adult     Colonoscopy refused     Essential (primary) hypertension     High risk medication use     Hx of radiation therapy     RT    Impacted cerumen of right ear     Mixed hyperlipidemia 2008       Past Surgical History:   Past Surgical History:   Procedure Laterality  Date    BREAST BIOPSY Right 2005    BREAST BIOPSY Left 2017    u/s bx     BREAST LUMPECTOMY Right 2005    BREAST LUMPECTOMY Left 2017    CATARACT EXTRACTION      OTHER SURGICAL HISTORY      sweatgland surgery       Family History:   Family History   Problem Relation Age of Onset    Esophageal cancer Mother     Thyroid cancer Mother     Breast cancer Sister 80    Lung cancer Sister     Hypertension Brother     Hyperlipidemia Other     Endometrial cancer Neg Hx     Ovarian cancer Neg Hx        Social History:   Social History     Socioeconomic History    Marital status:    Tobacco Use    Smoking status: Former     Types: Cigarettes     Quit date:      Years since quittin.6    Smokeless tobacco: Never   Vaping Use    Vaping Use: Never used   Substance and Sexual Activity    Alcohol use: Not Currently    Drug use: Never    Sexual activity: Defer       Medications:     Current Outpatient Medications:     amLODIPine (Norvasc) 10 MG tablet, Take 1 tablet by mouth Daily. Hold until due next refill, Disp: 90 tablet, Rfl: 3    atorvastatin (LIPITOR) 20 MG tablet, Take 1 tablet by mouth Daily. Hold until due next refill, Disp: 90 tablet, Rfl: 3    hydroCHLOROthiazide (HYDRODIURIL) 25 MG tablet, Take 1 tablet by mouth Daily. Hold until due next refill, Disp: 90 tablet, Rfl: 3    lisinopril (PRINIVIL,ZESTRIL) 40 MG tablet, Take 1 tablet by mouth Daily. Hold until due next refill, Disp: 90 tablet, Rfl: 3    omeprazole (priLOSEC) 20 MG capsule, Take 1 capsule by mouth Daily. Hold on file, Disp: 90 capsule, Rfl: 3    cholecalciferol (VITAMIN D3) 1000 UNITS tablet, Take 1 tablet by mouth Daily., Disp: , Rfl:     diphenhydrAMINE-acetaminophen (TYLENOL PM)  MG tablet per tablet, Take 2 tablets by mouth At Night As Needed for Sleep., Disp: , Rfl:     fluconazole (Diflucan) 150 MG tablet, Take 1 tablet by mouth 1 (One) Time for 1 dose., Disp: 1 tablet, Rfl: 0    multivitamin with minerals (PRESERVISION AREDS  "PO), Take 1 tablet by mouth 2 (Two) Times a Day., Disp: , Rfl:     nystatin (MYCOSTATIN) 412269 UNIT/GM cream, Apply 1 application  topically to the appropriate area as directed 2 (Two) Times a Day., Disp: 30 g, Rfl: 1    Allergies:   No Known Allergies    Objective     Physical Exam:  Vital Signs:   Vitals:    08/17/23 0922   BP: 160/88   Pulse: 102   SpO2: 98%   Weight: 92.1 kg (203 lb)   Height: 165.1 cm (65\")     Body mass index is 33.78 kg/mý.     Physical Exam  Vitals and nursing note reviewed.   Constitutional:       General: She is not in acute distress.     Appearance: Normal appearance. She is not ill-appearing.   HENT:      Head: Normocephalic and atraumatic.      Right Ear: Tympanic membrane and ear canal normal.      Left Ear: Tympanic membrane and ear canal normal.      Nose: Nose normal.   Cardiovascular:      Rate and Rhythm: Normal rate and regular rhythm.      Heart sounds: Normal heart sounds.   Pulmonary:      Effort: Pulmonary effort is normal.      Breath sounds: Normal breath sounds.   Skin:     Comments: Redness and axilla and under left breast area.  Palpable nodule right where her scar was with her previous breast surgery.   Neurological:      Mental Status: She is alert and oriented to person, place, and time. Mental status is at baseline.   Psychiatric:         Mood and Affect: Mood normal.       Procedures      Assessment / Plan      Assessment/Plan:   Diagnoses and all orders for this visit:    1. Breast nodule (Primary)  -     US Breast Right Complete; Future    2. Unspecified lump in the right breast, upper outer quadrant  -     US Breast Right Complete; Future    3. Essential hypertension  -     amLODIPine (Norvasc) 10 MG tablet; Take 1 tablet by mouth Daily. Hold until due next refill  Dispense: 90 tablet; Refill: 3  -     lisinopril (PRINIVIL,ZESTRIL) 40 MG tablet; Take 1 tablet by mouth Daily. Hold until due next refill  Dispense: 90 tablet; Refill: 3  -     hydroCHLOROthiazide " (HYDRODIURIL) 25 MG tablet; Take 1 tablet by mouth Daily. Hold until due next refill  Dispense: 90 tablet; Refill: 3  -     CBC Auto Differential; Future  -     Comprehensive Metabolic Panel; Future  -     Lipid Panel; Future    4. Mixed hyperlipidemia  -     atorvastatin (LIPITOR) 20 MG tablet; Take 1 tablet by mouth Daily. Hold until due next refill  Dispense: 90 tablet; Refill: 3  -     Comprehensive Metabolic Panel; Future  -     Lipid Panel; Future    5. Hyperglycemia  -     Hemoglobin A1c; Future    6. Candidiasis  -     fluconazole (Diflucan) 150 MG tablet; Take 1 tablet by mouth 1 (One) Time for 1 dose.  Dispense: 1 tablet; Refill: 0  -     nystatin (MYCOSTATIN) 845685 UNIT/GM cream; Apply 1 application  topically to the appropriate area as directed 2 (Two) Times a Day.  Dispense: 30 g; Refill: 1    7. Gastroesophageal reflux disease without esophagitis  -     omeprazole (priLOSEC) 20 MG capsule; Take 1 capsule by mouth Daily. Hold on file  Dispense: 90 capsule; Refill: 3    Other orders  -     Pneumococcal Conjugate Vaccine 20-Valent (PCV20)         We will continue and refill her chronic medications.  We are refilling some of her needed refills.  Treated her current yeast infection.  Gave her pneumonia shot today.    Follow Up:   No follow-ups on file.    Jeff Thompson  [facility]

## 2023-09-11 NOTE — TELEPHONE ENCOUNTER
PATIENT STATES SHE PICKED UP 2 PIECES OF HER LAB RESULTS IN OFFICE BUT SHE HAS STILL NOT RECEIVED ANY OTHER RESULTS.     PATIENT IS REQUESTING TO HAVE ALL OF HER LAB RESULTS FROM 7.26.23 MAILED TO HER.     [address] [place] [address]

## 2023-11-14 DIAGNOSIS — N63.0 BREAST NODULE: Primary | ICD-10-CM

## 2023-11-14 DIAGNOSIS — N63.11 UNSPECIFIED LUMP IN THE RIGHT BREAST, UPPER OUTER QUADRANT: ICD-10-CM

## 2023-11-22 ENCOUNTER — OFFICE VISIT (OUTPATIENT)
Dept: FAMILY MEDICINE CLINIC | Facility: CLINIC | Age: 78
End: 2023-11-22
Payer: MEDICARE

## 2023-11-22 VITALS
DIASTOLIC BLOOD PRESSURE: 82 MMHG | BODY MASS INDEX: 33.82 KG/M2 | OXYGEN SATURATION: 92 % | HEIGHT: 65 IN | HEART RATE: 102 BPM | WEIGHT: 203 LBS | SYSTOLIC BLOOD PRESSURE: 190 MMHG

## 2023-11-22 DIAGNOSIS — J40 BRONCHITIS: Primary | ICD-10-CM

## 2023-11-22 DIAGNOSIS — B37.9 CANDIDIASIS: ICD-10-CM

## 2023-11-22 PROCEDURE — 99214 OFFICE O/P EST MOD 30 MIN: CPT | Performed by: FAMILY MEDICINE

## 2023-11-22 PROCEDURE — 3079F DIAST BP 80-89 MM HG: CPT | Performed by: FAMILY MEDICINE

## 2023-11-22 PROCEDURE — 3077F SYST BP >= 140 MM HG: CPT | Performed by: FAMILY MEDICINE

## 2023-11-22 RX ORDER — NYSTATIN 100000 U/G
1 OINTMENT TOPICAL 2 TIMES DAILY
Qty: 30 G | Refills: 1 | Status: SHIPPED | OUTPATIENT
Start: 2023-11-22

## 2023-11-22 RX ORDER — DEXTROMETHORPHAN HYDROBROMIDE AND PROMETHAZINE HYDROCHLORIDE 15; 6.25 MG/5ML; MG/5ML
5 SYRUP ORAL 4 TIMES DAILY PRN
Qty: 120 ML | Refills: 0 | Status: SHIPPED | OUTPATIENT
Start: 2023-11-22

## 2023-11-22 RX ORDER — FLUCONAZOLE 150 MG/1
150 TABLET ORAL ONCE
Qty: 1 TABLET | Refills: 0 | Status: SHIPPED | OUTPATIENT
Start: 2023-11-22 | End: 2023-11-22

## 2023-11-22 RX ORDER — PREDNISONE 20 MG/1
TABLET ORAL
Qty: 18 TABLET | Refills: 0 | Status: SHIPPED | OUTPATIENT
Start: 2023-11-22

## 2023-11-22 RX ORDER — DOXYCYCLINE HYCLATE 100 MG/1
100 CAPSULE ORAL 2 TIMES DAILY
Qty: 20 CAPSULE | Refills: 0 | Status: SHIPPED | OUTPATIENT
Start: 2023-11-22

## 2023-11-23 NOTE — PROGRESS NOTES
Follow Up Office Visit      Date of Visit:  2023   Patient Name: Letitia Salazar  : 1945   MRN: [id number]     Chief Complaint:    Chief Complaint   Patient presents with    Cough       History of Present Illness: Letitia Salazar is a 78 y.o. female who is here today for follow up.  Patient seen today for cough and congestion for the past week.  Some wheezing noted.  No current fever.  She also occasionally gets some candidiasis underneath her breast.        Subjective      Review of Systems:   Review of Systems   Constitutional:  Negative for fatigue and fever.   HENT:  Negative for congestion and ear pain.    Respiratory:  Positive for cough and wheezing. Negative for apnea, chest tightness and shortness of breath.    Cardiovascular:  Negative for chest pain.   Gastrointestinal:  Negative for abdominal pain, constipation, diarrhea and nausea.   Musculoskeletal:  Negative for arthralgias.   Psychiatric/Behavioral:  Negative for depressed mood and stress.        Past Medical History:   Past Medical History:   Diagnosis Date    AK (actinic keratosis)     Breast cancer 2017    LT    Breast cancer 2005    RT     Cataract     Chronic GERD     Class 1 obesity due to excess calories with serious comorbidity and body mass index (BMI) of 34.0 to 34.9 in adult     Colonoscopy refused     Essential (primary) hypertension     High risk medication use     Hx of radiation therapy     RT    Impacted cerumen of right ear     Mixed hyperlipidemia 2008       Past Surgical History:   Past Surgical History:   Procedure Laterality Date    BREAST BIOPSY Right 2005    BREAST BIOPSY Left 2017    u/s bx     BREAST LUMPECTOMY Right 2005    BREAST LUMPECTOMY Left 2017    CATARACT EXTRACTION  2005    OTHER SURGICAL HISTORY      sweatgland surgery       Family History:   Family History   Problem Relation Age of Onset    Esophageal cancer Mother     Thyroid cancer Mother     Breast cancer Sister 80    Lung  cancer Sister     Hypertension Brother     Hyperlipidemia Other     Endometrial cancer Neg Hx     Ovarian cancer Neg Hx        Social History:   Social History     Socioeconomic History    Marital status:    Tobacco Use    Smoking status: Former     Types: Cigarettes     Quit date:      Years since quittin.9    Smokeless tobacco: Never   Vaping Use    Vaping Use: Never used   Substance and Sexual Activity    Alcohol use: Not Currently    Drug use: Never    Sexual activity: Defer       Medications:     Current Outpatient Medications:     amLODIPine (Norvasc) 10 MG tablet, Take 1 tablet by mouth Daily. Hold until due next refill, Disp: 90 tablet, Rfl: 3    atorvastatin (LIPITOR) 20 MG tablet, Take 1 tablet by mouth Daily. Hold until due next refill, Disp: 90 tablet, Rfl: 3    cholecalciferol (VITAMIN D3) 1000 UNITS tablet, Take 1 tablet by mouth Daily., Disp: , Rfl:     diphenhydrAMINE-acetaminophen (TYLENOL PM)  MG tablet per tablet, Take 2 tablets by mouth At Night As Needed for Sleep., Disp: , Rfl:     doxycycline (VIBRAMYCIN) 100 MG capsule, Take 1 capsule by mouth 2 (Two) Times a Day., Disp: 20 capsule, Rfl: 0    hydroCHLOROthiazide (HYDRODIURIL) 25 MG tablet, Take 1 tablet by mouth Daily. Hold until due next refill, Disp: 90 tablet, Rfl: 3    lisinopril (PRINIVIL,ZESTRIL) 40 MG tablet, Take 1 tablet by mouth Daily. Hold until due next refill, Disp: 90 tablet, Rfl: 3    multivitamin with minerals (PRESERVISION AREDS PO), Take 1 tablet by mouth 2 (Two) Times a Day., Disp: , Rfl:     nystatin (MYCOSTATIN) 501697 UNIT/GM ointment, Apply 1 application  topically to the appropriate area as directed 2 (Two) Times a Day., Disp: 30 g, Rfl: 1    omeprazole (priLOSEC) 20 MG capsule, Take 1 capsule by mouth Daily. Hold on file, Disp: 90 capsule, Rfl: 3    predniSONE (DELTASONE) 20 MG tablet, 3 po qd x3 d then 2 po qd x3d then 1 po qd x3d, Disp: 18 tablet, Rfl: 0    promethazine-dextromethorphan  "(PROMETHAZINE-DM) 6.25-15 MG/5ML syrup, Take 5 mL by mouth 4 (Four) Times a Day As Needed for Cough., Disp: 120 mL, Rfl: 0    Allergies:   No Known Allergies    Objective     Physical Exam:  Vital Signs:   Vitals:    11/22/23 1006   BP: (!) 190/82   Pulse: 102   SpO2: 92%   Weight: 92.1 kg (203 lb)   Height: 165.1 cm (65\")     Body mass index is 33.78 kg/m².     Physical Exam  Vitals and nursing note reviewed.   Constitutional:       Appearance: She is ill-appearing.   HENT:      Head: Normocephalic and atraumatic.   Cardiovascular:      Rate and Rhythm: Normal rate and regular rhythm.   Pulmonary:      Breath sounds: Decreased air movement present. Wheezing present.   Neurological:      General: No focal deficit present.      Mental Status: She is alert and oriented to person, place, and time.   Psychiatric:         Mood and Affect: Mood normal.         Procedures      Assessment / Plan      Assessment/Plan:   Diagnoses and all orders for this visit:    1. Bronchitis (Primary)  -     predniSONE (DELTASONE) 20 MG tablet; 3 po qd x3 d then 2 po qd x3d then 1 po qd x3d  Dispense: 18 tablet; Refill: 0  -     promethazine-dextromethorphan (PROMETHAZINE-DM) 6.25-15 MG/5ML syrup; Take 5 mL by mouth 4 (Four) Times a Day As Needed for Cough.  Dispense: 120 mL; Refill: 0  -     doxycycline (VIBRAMYCIN) 100 MG capsule; Take 1 capsule by mouth 2 (Two) Times a Day.  Dispense: 20 capsule; Refill: 0    2. Candidiasis  -     nystatin (MYCOSTATIN) 467165 UNIT/GM ointment; Apply 1 application  topically to the appropriate area as directed 2 (Two) Times a Day.  Dispense: 30 g; Refill: 1  -     fluconazole (Diflucan) 150 MG tablet; Take 1 tablet by mouth 1 (One) Time for 1 dose.  Dispense: 1 tablet; Refill: 0         Will give antibiotics steroids and cough medication.  Also gave nystatin and Diflucan for the yeast.    Follow Up:   No follow-ups on file.    Jeff Thompson  [facility] [place]   "

## 2024-01-31 ENCOUNTER — TRANSCRIBE ORDERS (OUTPATIENT)
Dept: ADMINISTRATIVE | Facility: HOSPITAL | Age: 79
End: 2024-01-31
Payer: MEDICARE

## 2024-01-31 DIAGNOSIS — Z12.31 VISIT FOR SCREENING MAMMOGRAM: Primary | ICD-10-CM

## 2024-02-12 ENCOUNTER — LAB (OUTPATIENT)
Dept: FAMILY MEDICINE CLINIC | Facility: CLINIC | Age: 79
End: 2024-02-12
Payer: MEDICARE

## 2024-02-13 LAB
ALBUMIN SERPL-MCNC: 4.3 G/DL (ref 3.8–4.8)
ALBUMIN/GLOB SERPL: 1.7 {RATIO} (ref 1.2–2.2)
ALP SERPL-CCNC: 100 IU/L (ref 44–121)
ALT SERPL-CCNC: 18 IU/L (ref 0–32)
AST SERPL-CCNC: 17 IU/L (ref 0–40)
BASOPHILS # BLD AUTO: 0.1 X10E3/UL (ref 0–0.2)
BASOPHILS NFR BLD AUTO: 1 %
BILIRUB SERPL-MCNC: 0.3 MG/DL (ref 0–1.2)
BUN SERPL-MCNC: 20 MG/DL (ref 8–27)
BUN/CREAT SERPL: 19 (ref 12–28)
CALCIUM SERPL-MCNC: 9.6 MG/DL (ref 8.7–10.3)
CHLORIDE SERPL-SCNC: 101 MMOL/L (ref 96–106)
CHOLEST SERPL-MCNC: 144 MG/DL (ref 100–199)
CO2 SERPL-SCNC: 23 MMOL/L (ref 20–29)
CREAT SERPL-MCNC: 1.05 MG/DL (ref 0.57–1)
EGFRCR SERPLBLD CKD-EPI 2021: 54 ML/MIN/1.73
EOSINOPHIL # BLD AUTO: 0.2 X10E3/UL (ref 0–0.4)
EOSINOPHIL NFR BLD AUTO: 2 %
ERYTHROCYTE [DISTWIDTH] IN BLOOD BY AUTOMATED COUNT: 13.9 % (ref 11.7–15.4)
GLOBULIN SER CALC-MCNC: 2.5 G/DL (ref 1.5–4.5)
GLUCOSE SERPL-MCNC: 136 MG/DL (ref 70–99)
HBA1C MFR BLD: 6.9 % (ref 4.8–5.6)
HCT VFR BLD AUTO: 37.4 % (ref 34–46.6)
HDLC SERPL-MCNC: 33 MG/DL
HGB BLD-MCNC: 12.1 G/DL (ref 11.1–15.9)
IMM GRANULOCYTES # BLD AUTO: 0 X10E3/UL (ref 0–0.1)
IMM GRANULOCYTES NFR BLD AUTO: 1 %
LDLC SERPL CALC-MCNC: 82 MG/DL (ref 0–99)
LYMPHOCYTES # BLD AUTO: 2.5 X10E3/UL (ref 0.7–3.1)
LYMPHOCYTES NFR BLD AUTO: 31 %
MCH RBC QN AUTO: 28.5 PG (ref 26.6–33)
MCHC RBC AUTO-ENTMCNC: 32.4 G/DL (ref 31.5–35.7)
MCV RBC AUTO: 88 FL (ref 79–97)
MONOCYTES # BLD AUTO: 0.7 X10E3/UL (ref 0.1–0.9)
MONOCYTES NFR BLD AUTO: 9 %
NEUTROPHILS # BLD AUTO: 4.7 X10E3/UL (ref 1.4–7)
NEUTROPHILS NFR BLD AUTO: 56 %
PLATELET # BLD AUTO: 327 X10E3/UL (ref 150–450)
POTASSIUM SERPL-SCNC: 4.7 MMOL/L (ref 3.5–5.2)
PROT SERPL-MCNC: 6.8 G/DL (ref 6–8.5)
RBC # BLD AUTO: 4.25 X10E6/UL (ref 3.77–5.28)
SODIUM SERPL-SCNC: 141 MMOL/L (ref 134–144)
TRIGL SERPL-MCNC: 164 MG/DL (ref 0–149)
VLDLC SERPL CALC-MCNC: 29 MG/DL (ref 5–40)
WBC # BLD AUTO: 8.2 X10E3/UL (ref 3.4–10.8)

## 2024-02-15 ENCOUNTER — OFFICE VISIT (OUTPATIENT)
Dept: FAMILY MEDICINE CLINIC | Facility: CLINIC | Age: 79
End: 2024-02-15
Payer: MEDICARE

## 2024-02-15 VITALS
DIASTOLIC BLOOD PRESSURE: 80 MMHG | WEIGHT: 203 LBS | SYSTOLIC BLOOD PRESSURE: 176 MMHG | HEIGHT: 65 IN | BODY MASS INDEX: 33.82 KG/M2 | OXYGEN SATURATION: 93 % | HEART RATE: 77 BPM

## 2024-02-15 DIAGNOSIS — I10 ESSENTIAL HYPERTENSION: ICD-10-CM

## 2024-02-15 DIAGNOSIS — G25.81 RESTLESS LEG SYNDROME: Primary | ICD-10-CM

## 2024-02-15 PROCEDURE — 99213 OFFICE O/P EST LOW 20 MIN: CPT | Performed by: FAMILY MEDICINE

## 2024-02-15 PROCEDURE — 3077F SYST BP >= 140 MM HG: CPT | Performed by: FAMILY MEDICINE

## 2024-02-15 PROCEDURE — 3079F DIAST BP 80-89 MM HG: CPT | Performed by: FAMILY MEDICINE

## 2024-02-15 RX ORDER — ROPINIROLE 0.25 MG/1
TABLET, FILM COATED ORAL
Qty: 60 TABLET | Refills: 2 | Status: SHIPPED | OUTPATIENT
Start: 2024-02-15

## 2024-03-13 ENCOUNTER — TELEPHONE (OUTPATIENT)
Dept: FAMILY MEDICINE CLINIC | Facility: CLINIC | Age: 79
End: 2024-03-13
Payer: MEDICARE

## 2024-03-28 ENCOUNTER — OFFICE VISIT (OUTPATIENT)
Dept: FAMILY MEDICINE CLINIC | Facility: CLINIC | Age: 79
End: 2024-03-28
Payer: MEDICARE

## 2024-03-28 VITALS
SYSTOLIC BLOOD PRESSURE: 130 MMHG | BODY MASS INDEX: 33.7 KG/M2 | TEMPERATURE: 98.5 F | HEIGHT: 65 IN | WEIGHT: 202.3 LBS | DIASTOLIC BLOOD PRESSURE: 80 MMHG | OXYGEN SATURATION: 97 % | HEART RATE: 102 BPM

## 2024-03-28 DIAGNOSIS — B96.89 ACUTE BACTERIAL SINUSITIS: Primary | ICD-10-CM

## 2024-03-28 DIAGNOSIS — R05.1 ACUTE COUGH: ICD-10-CM

## 2024-03-28 DIAGNOSIS — R09.81 NASAL CONGESTION: ICD-10-CM

## 2024-03-28 DIAGNOSIS — J01.90 ACUTE BACTERIAL SINUSITIS: Primary | ICD-10-CM

## 2024-03-28 LAB
EXPIRATION DATE: NORMAL
EXPIRATION DATE: NORMAL
FLUAV AG UPPER RESP QL IA.RAPID: NOT DETECTED
FLUBV AG UPPER RESP QL IA.RAPID: NOT DETECTED
INTERNAL CONTROL: NORMAL
INTERNAL CONTROL: NORMAL
Lab: NORMAL
Lab: NORMAL
S PYO AG THROAT QL: NEGATIVE
SARS-COV-2 AG UPPER RESP QL IA.RAPID: NOT DETECTED

## 2024-03-28 RX ORDER — AZITHROMYCIN 250 MG/1
TABLET, FILM COATED ORAL
Qty: 6 TABLET | Refills: 0 | Status: SHIPPED | OUTPATIENT
Start: 2024-03-28

## 2024-03-28 RX ORDER — BROMPHENIRAMINE MALEATE, PSEUDOEPHEDRINE HYDROCHLORIDE, AND DEXTROMETHORPHAN HYDROBROMIDE 2; 30; 10 MG/5ML; MG/5ML; MG/5ML
5-10 SYRUP ORAL 4 TIMES DAILY PRN
Qty: 473 ML | Refills: 0 | Status: SHIPPED | OUTPATIENT
Start: 2024-03-28

## 2024-03-28 NOTE — ASSESSMENT & PLAN NOTE
Patient reports she has a productive cough with thick mucus. She reports it seems worse at night and first thing in the morning. She has been using AlkaSeltzer Plus and Flonase without improvement.

## 2024-03-28 NOTE — PROGRESS NOTES
"    Office Note     Name: Letitia Salazar    : 1945     MRN: [id number]     Chief Complaint  Cough (Pt states he's been coughing for a week now along with phlegm ) and Nasal Congestion (Pt states her nose has been runny along with congestion )    Subjective     History of Present Illness:  Letitia Salazar is a 78 y.o. female who presents today for cough and congestion for the past week.       Objective     Past Medical History:   Diagnosis Date    AK (actinic keratosis)     Breast cancer 2017    LT    Breast cancer     RT     Cataract     Chronic GERD     Class 1 obesity due to excess calories with serious comorbidity and body mass index (BMI) of 34.0 to 34.9 in adult     Colonoscopy refused     Essential (primary) hypertension     High risk medication use     Hx of radiation therapy     RT    Impacted cerumen of right ear     Mixed hyperlipidemia 2008     Past Surgical History:   Procedure Laterality Date    BREAST BIOPSY Right 2005    BREAST BIOPSY Left 2017    u/s bx     BREAST LUMPECTOMY Right 2005    BREAST LUMPECTOMY Left 2017    CATARACT EXTRACTION      OTHER SURGICAL HISTORY      sweatgland surgery     Family History   Problem Relation Age of Onset    Esophageal cancer Mother     Thyroid cancer Mother     Breast cancer Sister 80    Lung cancer Sister     Hypertension Brother     Hyperlipidemia Other     Endometrial cancer Neg Hx     Ovarian cancer Neg Hx        Vital Signs  /80 (BP Location: Left arm, Patient Position: Sitting, Cuff Size: Adult)   Pulse 102   Temp 98.5 °F (36.9 °C) (Oral)   Ht 165.1 cm (65\")   Wt 91.8 kg (202 lb 4.8 oz)   SpO2 97%   BMI 33.66 kg/m²   Estimated body mass index is 33.66 kg/m² as calculated from the following:    Height as of this encounter: 165.1 cm (65\").    Weight as of this encounter: 91.8 kg (202 lb 4.8 oz).    Physical Exam  Vitals reviewed.   Constitutional:       Appearance: Normal appearance.   HENT:      Head: Normocephalic. "      Right Ear: Tympanic membrane, ear canal and external ear normal.      Left Ear: Tympanic membrane, ear canal and external ear normal.      Nose: Congestion present.      Mouth/Throat:      Mouth: Mucous membranes are moist.      Pharynx: Posterior oropharyngeal erythema (with thick postnasal drainage present) present.   Cardiovascular:      Rate and Rhythm: Normal rate and regular rhythm.      Heart sounds: Normal heart sounds.   Pulmonary:      Effort: Pulmonary effort is normal.      Breath sounds: Normal breath sounds.   Skin:     General: Skin is warm and dry.      Capillary Refill: Capillary refill takes less than 2 seconds.   Neurological:      General: No focal deficit present.      Mental Status: She is alert and oriented to person, place, and time.   Psychiatric:         Mood and Affect: Mood normal.         Behavior: Behavior normal.                   POCT Results (if applicable):  Results for orders placed or performed in visit on 03/28/24   POCT SARS-CoV-2 + Flu Antigen ANDREW    Specimen: Swab   Result Value Ref Range    SARS Antigen Not Detected Not Detected, Presumptive Negative    Influenza A Antigen ANDREW Not Detected Not Detected    Influenza B Antigen ANDREW Not Detected Not Detected    Internal Control Passed Passed    Lot Number [id number]     Expiration Date 01/17/2025    POC Rapid Strep A    Specimen: Swab   Result Value Ref Range    Rapid Strep A Screen Negative Negative, VALID, INVALID, Not Performed    Internal Control Passed Passed    Lot Number [id number]     Expiration Date 10/02/2026             Assessment and Plan     Diagnoses and all orders for this visit:    1. Acute bacterial sinusitis (Primary)  Assessment & Plan:  Symptoms and physical exam consistent with bacterial sinusitis.   Discussed risks/benefits and possible side effects of treatment options.   Return to the office if not improved by Monday    Orders:  -     azithromycin (Zithromax Z-Satnam) 250 MG tablet; Take 2 tablets by mouth  on day 1, then 1 tablet daily on days 2-5  Dispense: 6 tablet; Refill: 0  -     brompheniramine-pseudoephedrine-DM 30-2-10 MG/5ML syrup; Take 5-10 mL by mouth 4 (Four) Times a Day As Needed for Congestion or Cough.  Dispense: 473 mL; Refill: 0    2. Nasal congestion  Assessment & Plan:  Patient reports she has had nasal congestion and postnasal drainage for the past week. She has been using AlkaSeltzer Plus and Flonase without improvement.     Orders:  -     POCT SARS-CoV-2 + Flu Antigen ANDREW  -     POC Rapid Strep A    3. Acute cough  Assessment & Plan:  Patient reports she has a productive cough with thick mucus. She reports it seems worse at night and first thing in the morning. She has been using AlkaSeltzer Plus and Flonase without improvement.     Orders:  -     POCT SARS-CoV-2 + Flu Antigen ANDREW  -     POC Rapid Strep A      BMI is >= 30 and <35. (Class 1 Obesity). The following options were offered after discussion;: exercise counseling/recommendations and nutrition counseling/recommendations        Vaccine Counseling:      Advanced Care Planning:   Patient does not have an advance directive, declines further assistance.    Smoking Cessation:   Former smoker    Follow Up  Return if symptoms worsen or fail to improve.    Heather Tipton, APRN

## 2024-03-28 NOTE — ASSESSMENT & PLAN NOTE
Symptoms and physical exam consistent with bacterial sinusitis.   Discussed risks/benefits and possible side effects of treatment options.   Return to the office if not improved by Monday

## 2024-03-28 NOTE — ASSESSMENT & PLAN NOTE
Patient reports she has had nasal congestion and postnasal drainage for the past week. She has been using AlkaSeltzer Plus and Flonase without improvement.

## 2024-04-25 ENCOUNTER — OFFICE VISIT (OUTPATIENT)
Dept: FAMILY MEDICINE CLINIC | Facility: CLINIC | Age: 79
End: 2024-04-25
Payer: MEDICARE

## 2024-04-25 VITALS
OXYGEN SATURATION: 96 % | HEART RATE: 83 BPM | DIASTOLIC BLOOD PRESSURE: 66 MMHG | SYSTOLIC BLOOD PRESSURE: 130 MMHG | HEIGHT: 65 IN | WEIGHT: 201 LBS | BODY MASS INDEX: 33.49 KG/M2

## 2024-04-25 DIAGNOSIS — G89.29 CHRONIC BILATERAL LOW BACK PAIN WITH RIGHT-SIDED SCIATICA: Primary | ICD-10-CM

## 2024-04-25 DIAGNOSIS — M54.41 CHRONIC BILATERAL LOW BACK PAIN WITH RIGHT-SIDED SCIATICA: Primary | ICD-10-CM

## 2024-04-25 PROCEDURE — 99213 OFFICE O/P EST LOW 20 MIN: CPT | Performed by: PHYSICIAN ASSISTANT

## 2024-04-25 PROCEDURE — 3078F DIAST BP <80 MM HG: CPT | Performed by: PHYSICIAN ASSISTANT

## 2024-04-25 PROCEDURE — 3075F SYST BP GE 130 - 139MM HG: CPT | Performed by: PHYSICIAN ASSISTANT

## 2024-04-25 RX ORDER — PREDNISONE 20 MG/1
TABLET ORAL
Qty: 9 TABLET | Refills: 0 | Status: SHIPPED | OUTPATIENT
Start: 2024-04-25

## 2024-04-25 NOTE — PROGRESS NOTES
"Chief Complaint  Leg Pain (Right lower back pain going into right leg going on for months pt states pain has gotten worse. )    Subjective          Letitia Salazar presents to [facility]  History of Present Illness  Patient in today for evaluation on lower back pain that she states has had for several years. States had xray in 2021 that showed mild arthritis to lower back. She states over past year she has had more issues with it flaring up and causing pain into right upper thigh intermittently. Denies numbness into leg. She does also have restless leg syndrome and states the ropinirole has not covered all the symptoms of that. She denies changes to urine/bowel habits. Denies any new injury to lower back.   Back Pain  This is a chronic problem. The pain is present in the lumbar spine. The quality of the pain is described as aching. The pain radiates to the right buttock and right thigh. Pertinent negatives include no bladder incontinence, bowel incontinence, fever, paresthesias or tingling.       Objective   Vital Signs:   /66   Pulse 83   Ht 165.1 cm (65\")   Wt 91.2 kg (201 lb)   SpO2 96%   BMI 33.45 kg/m²     Body mass index is 33.45 kg/m².    Review of Systems   Constitutional:  Negative for fever.   Gastrointestinal:  Negative for bowel incontinence.   Genitourinary:  Negative for urinary incontinence.   Musculoskeletal:  Positive for arthralgias and back pain.   Neurological:  Negative for tingling and paresthesias.       Past History:  Medical History: has a past medical history of AK (actinic keratosis), Breast cancer (2017), Breast cancer (2005), Cataract, Chronic GERD, Class 1 obesity due to excess calories with serious comorbidity and body mass index (BMI) of 34.0 to 34.9 in adult, Colonoscopy refused, Essential (primary) hypertension, High risk medication use, radiation therapy (2005), Impacted cerumen of right ear, and Mixed hyperlipidemia (02/25/2008). "   Surgical History: has a past surgical history that includes Breast lumpectomy (Right, 2005); Breast lumpectomy (Left, 2017); Breast biopsy (Right, 2005); Breast biopsy (Left, 2017); Cataract extraction (2005); and Other surgical history.   Family History: family history includes Breast cancer (age of onset: 80) in her sister; Esophageal cancer in her mother; Hyperlipidemia in an other family member; Hypertension in her brother; Lung cancer in her sister; Thyroid cancer in her mother.   Social History: reports that she quit smoking about 12 years ago. Her smoking use included cigarettes. She has never used smokeless tobacco. She reports that she does not currently use alcohol. She reports that she does not use drugs.      Current Outpatient Medications:     amLODIPine (Norvasc) 10 MG tablet, Take 1 tablet by mouth Daily. Hold until due next refill, Disp: 90 tablet, Rfl: 3    atorvastatin (LIPITOR) 20 MG tablet, Take 1 tablet by mouth Daily. Hold until due next refill, Disp: 90 tablet, Rfl: 3    cholecalciferol (VITAMIN D3) 1000 UNITS tablet, Take 1 tablet by mouth Daily., Disp: , Rfl:     diphenhydrAMINE-acetaminophen (TYLENOL PM)  MG tablet per tablet, Take 2 tablets by mouth At Night As Needed for Sleep., Disp: , Rfl:     hydroCHLOROthiazide (HYDRODIURIL) 25 MG tablet, Take 1 tablet by mouth Daily. Hold until due next refill, Disp: 90 tablet, Rfl: 3    lisinopril (PRINIVIL,ZESTRIL) 40 MG tablet, Take 1 tablet by mouth Daily. Hold until due next refill, Disp: 90 tablet, Rfl: 3    omeprazole (priLOSEC) 20 MG capsule, Take 1 capsule by mouth Daily. Hold on file, Disp: 90 capsule, Rfl: 3    rOPINIRole (REQUIP) 0.25 MG tablet, 1-2 po qhs, Disp: 60 tablet, Rfl: 2    predniSONE (DELTASONE) 20 MG tablet, Take 2 tablets by oral route QD x 3 days; then 1 tablet by oral route QD x 3 days, Disp: 9 tablet, Rfl: 0  Allergies: Patient has no known allergies.    Physical Exam  Constitutional:       Appearance: Normal  appearance.   Musculoskeletal:      Comments: Mild tenderness to palpation of lumbar spine and into right buttocks area; walks with normal gait; FROM of lower back ; neg sitting SLR bilaterally    Neurological:      Mental Status: She is alert and oriented to person, place, and time.   Psychiatric:         Mood and Affect: Mood normal.         Behavior: Behavior normal.             Assessment and Plan   Diagnoses and all orders for this visit:    1. Chronic bilateral low back pain with right-sided sciatica (Primary)  Offered to check xrays today - pt declines but states will consider if symptoms persist; will give short burst of prednisone- avoid nsaids while taking and discussed possible side effects; monitor sugar levels; rtc if not improving   Other orders  -     predniSONE (DELTASONE) 20 MG tablet; Take 2 tablets by oral route QD x 3 days; then 1 tablet by oral route QD x 3 days  Dispense: 9 tablet; Refill: 0            Follow Up   No follow-ups on file.  Patient was given instructions and counseling regarding her condition or for health maintenance advice. Please see specific information pulled into the AVS if appropriate.     Diana Joseph PA-C

## 2024-04-26 ENCOUNTER — TELEPHONE (OUTPATIENT)
Dept: FAMILY MEDICINE CLINIC | Facility: CLINIC | Age: 79
End: 2024-04-26

## 2024-04-26 NOTE — TELEPHONE ENCOUNTER
"    Caller: Letitia Salazar \"Radha\"    Relationship: Self    Best call back number: [phone]     Equipment requested: WALKER      Prescribing Provider: NOT SURE    Additional information or concerns: PATIENT STATED THAT SHE IS GOING TO BUY A WALKER.  SO SHE ASK THAT YOU PLEASE CANCEL THE REQUEST, FROM YESTERDAY 4/25/24,  FOR MEDICARE TO PAY FOR ONE FOR HER.    THANK YOU.          "

## 2024-05-07 ENCOUNTER — OFFICE VISIT (OUTPATIENT)
Dept: FAMILY MEDICINE CLINIC | Facility: CLINIC | Age: 79
End: 2024-05-07
Payer: MEDICARE

## 2024-05-07 VITALS
HEIGHT: 65 IN | BODY MASS INDEX: 33.41 KG/M2 | HEART RATE: 101 BPM | SYSTOLIC BLOOD PRESSURE: 130 MMHG | OXYGEN SATURATION: 97 % | DIASTOLIC BLOOD PRESSURE: 70 MMHG | WEIGHT: 200.5 LBS | TEMPERATURE: 98.5 F

## 2024-05-07 DIAGNOSIS — J01.90 ACUTE BACTERIAL SINUSITIS: Primary | ICD-10-CM

## 2024-05-07 DIAGNOSIS — G89.29 CHRONIC BILATERAL LOW BACK PAIN WITH RIGHT-SIDED SCIATICA: ICD-10-CM

## 2024-05-07 DIAGNOSIS — B96.89 ACUTE BACTERIAL SINUSITIS: Primary | ICD-10-CM

## 2024-05-07 DIAGNOSIS — M54.41 CHRONIC BILATERAL LOW BACK PAIN WITH RIGHT-SIDED SCIATICA: ICD-10-CM

## 2024-05-07 DIAGNOSIS — R05.9 COUGH IN ADULT: ICD-10-CM

## 2024-05-07 PROCEDURE — 87880 STREP A ASSAY W/OPTIC: CPT | Performed by: NURSE PRACTITIONER

## 2024-05-07 PROCEDURE — 3078F DIAST BP <80 MM HG: CPT | Performed by: NURSE PRACTITIONER

## 2024-05-07 PROCEDURE — 3075F SYST BP GE 130 - 139MM HG: CPT | Performed by: NURSE PRACTITIONER

## 2024-05-07 PROCEDURE — 99214 OFFICE O/P EST MOD 30 MIN: CPT | Performed by: NURSE PRACTITIONER

## 2024-05-07 PROCEDURE — 1126F AMNT PAIN NOTED NONE PRSNT: CPT | Performed by: NURSE PRACTITIONER

## 2024-05-07 PROCEDURE — 87428 SARSCOV & INF VIR A&B AG IA: CPT | Performed by: NURSE PRACTITIONER

## 2024-05-07 RX ORDER — DOXYCYCLINE HYCLATE 100 MG/1
100 CAPSULE ORAL 2 TIMES DAILY
Qty: 14 CAPSULE | Refills: 0 | Status: SHIPPED | OUTPATIENT
Start: 2024-05-07

## 2024-05-07 RX ORDER — PREDNISONE 20 MG/1
TABLET ORAL
Qty: 12 TABLET | Refills: 0 | Status: SHIPPED | OUTPATIENT
Start: 2024-05-07

## 2024-05-09 PROBLEM — G89.29 CHRONIC BILATERAL LOW BACK PAIN WITH RIGHT-SIDED SCIATICA: Status: ACTIVE | Noted: 2024-05-09

## 2024-05-09 PROBLEM — M54.41 CHRONIC BILATERAL LOW BACK PAIN WITH RIGHT-SIDED SCIATICA: Status: ACTIVE | Noted: 2024-05-09

## 2024-05-09 LAB
BACTERIA SPEC RESP CULT: NORMAL
BACTERIA SPEC RESP CULT: NORMAL

## 2024-05-28 ENCOUNTER — HOSPITAL ENCOUNTER (OUTPATIENT)
Dept: MAMMOGRAPHY | Facility: HOSPITAL | Age: 79
Discharge: HOME | End: 2024-05-28
Admitting: FAMILY MEDICINE
Payer: MEDICARE

## 2024-05-28 DIAGNOSIS — Z12.31 VISIT FOR SCREENING MAMMOGRAM: ICD-10-CM

## 2024-05-28 PROCEDURE — 77067 SCR MAMMO BI INCL CAD: CPT

## 2024-05-28 PROCEDURE — 77063 BREAST TOMOSYNTHESIS BI: CPT

## 2024-06-17 ENCOUNTER — HOSPITAL ENCOUNTER (OUTPATIENT)
Dept: MAMMOGRAPHY | Facility: HOSPITAL | Age: 79
Discharge: HOME | End: 2024-06-17
Payer: MEDICARE

## 2024-06-17 DIAGNOSIS — R92.8 ABNORMAL MAMMOGRAM: ICD-10-CM

## 2024-06-18 ENCOUNTER — TELEPHONE (OUTPATIENT)
Dept: FAMILY MEDICINE CLINIC | Facility: CLINIC | Age: 79
End: 2024-06-18
Payer: MEDICARE

## 2024-06-18 NOTE — TELEPHONE ENCOUNTER
"Name: Letitia Salazar \"Radha\"    Relationship: Self    Best Callback Number:      [phone] (Home)     HUB PROVIDED THE RELAY MESSAGE FROM THE OFFICE   PATIENT     VOICED UNDERSTANDING AND HAS NO FURTHER QUESTIONS AT THIS TIME    "

## 2024-07-31 ENCOUNTER — HOSPITAL ENCOUNTER (EMERGENCY)
Facility: HOSPITAL | Age: 79
Discharge: HOME | End: 2024-07-31
Attending: EMERGENCY MEDICINE
Payer: MEDICARE

## 2024-07-31 ENCOUNTER — APPOINTMENT (OUTPATIENT)
Dept: CT IMAGING | Facility: HOSPITAL | Age: 79
End: 2024-07-31
Payer: MEDICARE

## 2024-07-31 VITALS
DIASTOLIC BLOOD PRESSURE: 43 MMHG | HEIGHT: 65 IN | WEIGHT: 200 LBS | TEMPERATURE: 98.2 F | BODY MASS INDEX: 33.32 KG/M2 | HEART RATE: 91 BPM | SYSTOLIC BLOOD PRESSURE: 111 MMHG | RESPIRATION RATE: 20 BRPM | OXYGEN SATURATION: 91 %

## 2024-07-31 DIAGNOSIS — N32.89 BLADDER MASS: Primary | ICD-10-CM

## 2024-07-31 DIAGNOSIS — R10.9 ACUTE ABDOMINAL PAIN: ICD-10-CM

## 2024-07-31 DIAGNOSIS — R33.9 URINARY RETENTION: ICD-10-CM

## 2024-07-31 DIAGNOSIS — R31.9 HEMATURIA, UNSPECIFIED TYPE: ICD-10-CM

## 2024-07-31 LAB
ALBUMIN SERPL-MCNC: 4.4 G/DL (ref 3.5–5.2)
ALBUMIN/GLOB SERPL: 1.3 G/DL
ALP SERPL-CCNC: 120 U/L (ref 39–117)
ALT SERPL W P-5'-P-CCNC: 16 U/L (ref 1–33)
ANION GAP SERPL CALCULATED.3IONS-SCNC: 13 MMOL/L (ref 5–15)
AST SERPL-CCNC: 18 U/L (ref 1–32)
BACTERIA UR QL AUTO: ABNORMAL /HPF
BASOPHILS # BLD AUTO: 0.04 10*3/MM3 (ref 0–0.2)
BASOPHILS NFR BLD AUTO: 0.3 % (ref 0–1.5)
BILIRUB SERPL-MCNC: 0.4 MG/DL (ref 0–1.2)
BILIRUB UR QL STRIP: NEGATIVE
BUN SERPL-MCNC: 21 MG/DL (ref 8–23)
BUN/CREAT SERPL: 18.8 (ref 7–25)
CALCIUM SPEC-SCNC: 8.7 MG/DL (ref 8.6–10.5)
CHLORIDE SERPL-SCNC: 101 MMOL/L (ref 98–107)
CLARITY UR: ABNORMAL
CO2 SERPL-SCNC: 27 MMOL/L (ref 22–29)
COLOR UR: ABNORMAL
CREAT SERPL-MCNC: 1.12 MG/DL (ref 0.57–1)
D-LACTATE SERPL-SCNC: 1.8 MMOL/L (ref 0.5–2)
D-LACTATE SERPL-SCNC: 2.5 MMOL/L (ref 0.5–2)
DEPRECATED RDW RBC AUTO: 45.1 FL (ref 37–54)
EGFRCR SERPLBLD CKD-EPI 2021: 50.1 ML/MIN/1.73
EOSINOPHIL # BLD AUTO: 0.03 10*3/MM3 (ref 0–0.4)
EOSINOPHIL NFR BLD AUTO: 0.2 % (ref 0.3–6.2)
ERYTHROCYTE [DISTWIDTH] IN BLOOD BY AUTOMATED COUNT: 13.9 % (ref 12.3–15.4)
GLOBULIN UR ELPH-MCNC: 3.5 GM/DL
GLUCOSE SERPL-MCNC: 162 MG/DL (ref 65–99)
GLUCOSE UR STRIP-MCNC: NEGATIVE MG/DL
HCT VFR BLD AUTO: 37.6 % (ref 34–46.6)
HGB BLD-MCNC: 12.2 G/DL (ref 12–15.9)
HGB UR QL STRIP.AUTO: ABNORMAL
HOLD SPECIMEN: NORMAL
HYALINE CASTS UR QL AUTO: ABNORMAL /LPF
IMM GRANULOCYTES # BLD AUTO: 0.06 10*3/MM3 (ref 0–0.05)
IMM GRANULOCYTES NFR BLD AUTO: 0.5 % (ref 0–0.5)
KETONES UR QL STRIP: NEGATIVE
LEUKOCYTE ESTERASE UR QL STRIP.AUTO: ABNORMAL
LIPASE SERPL-CCNC: 46 U/L (ref 13–60)
LYMPHOCYTES # BLD AUTO: 1.61 10*3/MM3 (ref 0.7–3.1)
LYMPHOCYTES NFR BLD AUTO: 13.3 % (ref 19.6–45.3)
MCH RBC QN AUTO: 29 PG (ref 26.6–33)
MCHC RBC AUTO-ENTMCNC: 32.4 G/DL (ref 31.5–35.7)
MCV RBC AUTO: 89.5 FL (ref 79–97)
MONOCYTES # BLD AUTO: 0.75 10*3/MM3 (ref 0.1–0.9)
MONOCYTES NFR BLD AUTO: 6.2 % (ref 5–12)
NEUTROPHILS NFR BLD AUTO: 79.5 % (ref 42.7–76)
NEUTROPHILS NFR BLD AUTO: 9.57 10*3/MM3 (ref 1.7–7)
NITRITE UR QL STRIP: NEGATIVE
NRBC BLD AUTO-RTO: 0 /100 WBC (ref 0–0.2)
PH UR STRIP.AUTO: 6.5 [PH] (ref 5–8)
PLATELET # BLD AUTO: 362 10*3/MM3 (ref 140–450)
PMV BLD AUTO: 9.8 FL (ref 6–12)
POTASSIUM SERPL-SCNC: 4.5 MMOL/L (ref 3.5–5.2)
PROT SERPL-MCNC: 7.9 G/DL (ref 6–8.5)
PROT UR QL STRIP: ABNORMAL
RBC # BLD AUTO: 4.2 10*6/MM3 (ref 3.77–5.28)
RBC # UR STRIP: ABNORMAL /HPF
REF LAB TEST METHOD: ABNORMAL
SODIUM SERPL-SCNC: 141 MMOL/L (ref 136–145)
SP GR UR STRIP: 1.01 (ref 1–1.03)
SQUAMOUS #/AREA URNS HPF: ABNORMAL /HPF
UROBILINOGEN UR QL STRIP: ABNORMAL
WBC # UR STRIP: ABNORMAL /HPF
WBC NRBC COR # BLD AUTO: 12.06 10*3/MM3 (ref 3.4–10.8)
WHOLE BLOOD HOLD COAG: NORMAL
WHOLE BLOOD HOLD SPECIMEN: NORMAL

## 2024-07-31 PROCEDURE — 87086 URINE CULTURE/COLONY COUNT: CPT | Performed by: NURSE PRACTITIONER

## 2024-07-31 PROCEDURE — 36415 COLL VENOUS BLD VENIPUNCTURE: CPT

## 2024-07-31 PROCEDURE — 80053 COMPREHEN METABOLIC PANEL: CPT

## 2024-07-31 PROCEDURE — 83605 ASSAY OF LACTIC ACID: CPT

## 2024-07-31 PROCEDURE — 74176 CT ABD & PELVIS W/O CONTRAST: CPT

## 2024-07-31 PROCEDURE — 85025 COMPLETE CBC W/AUTO DIFF WBC: CPT

## 2024-07-31 PROCEDURE — 51798 US URINE CAPACITY MEASURE: CPT

## 2024-07-31 PROCEDURE — 83605 ASSAY OF LACTIC ACID: CPT | Performed by: EMERGENCY MEDICINE

## 2024-07-31 PROCEDURE — 81001 URINALYSIS AUTO W/SCOPE: CPT | Performed by: EMERGENCY MEDICINE

## 2024-07-31 PROCEDURE — 83690 ASSAY OF LIPASE: CPT

## 2024-07-31 PROCEDURE — 51702 INSERT TEMP BLADDER CATH: CPT

## 2024-07-31 PROCEDURE — 99284 EMERGENCY DEPT VISIT MOD MDM: CPT

## 2024-07-31 RX ORDER — CEFUROXIME AXETIL 250 MG/1
500 TABLET ORAL ONCE
Status: COMPLETED | OUTPATIENT
Start: 2024-07-31 | End: 2024-07-31

## 2024-07-31 RX ORDER — SODIUM CHLORIDE 9 MG/ML
10 INJECTION, SOLUTION INTRAMUSCULAR; INTRAVENOUS; SUBCUTANEOUS AS NEEDED
Status: DISCONTINUED | OUTPATIENT
Start: 2024-07-31 | End: 2024-07-31 | Stop reason: HOSPADM

## 2024-07-31 RX ADMIN — CEFUROXIME AXETIL 500 MG: 250 TABLET ORAL at 18:29

## 2024-07-31 NOTE — CASE MANAGEMENT/SOCIAL WORK
Discharge Planning Assessment  [facility]     Patient Name: Letitia Salazar  MRN: [id number]  Today's Date: 7/31/2024    Admit Date: 7/31/2024    Plan: IDP   Discharge Needs Assessment       Row Name 07/31/24 1637       Living Environment    People in Home alone    Current Living Arrangements home    Primary Care Provided by self    Family Caregiver if Needed child(karuna), adult    Quality of Family Relationships helpful;involved    Able to Return to Prior Arrangements yes       Transition Planning    Transportation Anticipated car, drives self;family or friend will provide       Discharge Needs Assessment    Readmission Within the Last 30 Days no previous admission in last 30 days    Equipment Currently Used at Home none    Concerns to be Addressed denies needs/concerns at this time                   Discharge Plan       Row Name 07/31/24 1637       Plan    Plan IDP    Plan Comments MSW met with Pt and daughter at bedside to complete IDP. Pt lives alone in [facility]. Pt confirmed demographics and reports PCP is Dr. Thompson. Pt has Medicare and [employer] insurance coverage. Pt independent with ADLs; Pt reports no DME, HH, or home O2. Pt’s preferred pharmacy is [facility]. Pt still drives and reports daughter can transport when medically ready. Pt denied needs or concerns. CM will continue to follow.    Final Discharge Disposition Code 30 - still a patient                  Continued Care and Services - Admitted Since 7/31/2024    No active coordination exists for this encounter.          Demographic Summary       Row Name 07/31/24 1636       General Information    Arrived From home    Referral Source admission list;emergency department    Reason for Consult discharge planning    Preferred Language English                   Functional Status       Row Name 07/31/24 1636       Functional Status    Usual Activity Tolerance good    Current Activity Tolerance good       Functional Status, IADL    Medications  independent    Meal Preparation independent    Housekeeping independent    Laundry independent    Shopping independent                               VELIA Salcido

## 2024-07-31 NOTE — DISCHARGE INSTRUCTIONS
I spoke to Dr. Smipson.  He advised to call his office tomorrow to be seen this week by him or one of his associates at one of his office locations.

## 2024-07-31 NOTE — ED PROVIDER NOTES
Subjective   History of Present Illness  Pleasant patient with lower abdominal pain and decreased urination.  She reports last time she urinated was last night around 11 PM.  She does have a history of an overactive bladder.  She denies any medications for this or any new medications.  She denies any fevers or chills.  She did take the ambulance to the hospital.  I have evaluated the patient in pit.  She has already been in our ER 3 hours prior to my evaluation.  Some lab work is already returned.  Will need to get a bladder scan and CAT scan and urinalysis.  Most likely the patient will need a urinary catheter for further evaluation.        Review of Systems    Past Medical History:   Diagnosis Date    AK (actinic keratosis)     Breast cancer 2017    LT    Breast cancer     RT     Cataract     Chronic GERD     Class 1 obesity due to excess calories with serious comorbidity and body mass index (BMI) of 34.0 to 34.9 in adult     Colonoscopy refused     Essential (primary) hypertension     High risk medication use     Hx of radiation therapy     RT    Impacted cerumen of right ear     Mixed hyperlipidemia 2008       No Known Allergies    Past Surgical History:   Procedure Laterality Date    BREAST BIOPSY Right 2005    BREAST BIOPSY Left 2017    u/s bx     BREAST LUMPECTOMY Right 2005    BREAST LUMPECTOMY Left 2017    CATARACT EXTRACTION      OTHER SURGICAL HISTORY      sweatgland surgery       Family History   Problem Relation Age of Onset    Esophageal cancer Mother     Thyroid cancer Mother     Breast cancer Sister 80    Lung cancer Sister     Hypertension Brother     Hyperlipidemia Other     Endometrial cancer Neg Hx     Ovarian cancer Neg Hx        Social History     Socioeconomic History    Marital status:    Tobacco Use    Smoking status: Former     Current packs/day: 0.00     Types: Cigarettes     Quit date:      Years since quittin.5    Smokeless tobacco: Never   Vaping Use     Vaping status: Never Used   Substance and Sexual Activity    Alcohol use: Not Currently    Drug use: Never    Sexual activity: Defer           Objective   Physical Exam  Constitutional:       Appearance: She is well-developed.   HENT:      Head: Normocephalic and atraumatic.      Right Ear: External ear normal.      Left Ear: External ear normal.      Nose: Nose normal.   Eyes:      Conjunctiva/sclera: Conjunctivae normal.      Pupils: Pupils are equal, round, and reactive to light.   Cardiovascular:      Rate and Rhythm: Normal rate and regular rhythm.      Heart sounds: Normal heart sounds.   Pulmonary:      Effort: Pulmonary effort is normal.      Breath sounds: Normal breath sounds.   Abdominal:      General: Bowel sounds are normal.      Palpations: Abdomen is soft.      Tenderness: There is abdominal tenderness.   Musculoskeletal:         General: Normal range of motion.      Cervical back: Normal range of motion and neck supple.   Skin:     General: Skin is warm and dry.   Neurological:      Mental Status: She is alert and oriented to person, place, and time.   Psychiatric:         Behavior: Behavior normal.         Thought Content: Thought content normal.         Judgment: Judgment normal.         Procedures           ED Course  ED Course as of 08/01/24 2144 Wed Jul 31, 2024   1401 Awaiting urinalysis.  Most likely need a catheter.  Will need do a bladder scan as well as a CAT scan for further evaluation. [JM]   1407 Patient has been here 3 hours.  Came in by EMS.  Awaiting a room.  I advised the charge nurse show patient needs a catheter.  Unable to place the catheter in pit. [JM]   8937 I have paged urology to discuss [JM]   3004 I spoke with Dr. De Jesus.  I reviewed the CAT scan with him.  Patient would prefer to go home as she is comfortable after the [place] catheter.  He is okay with her going home.  I gave him the patient's name and birthdate.  Advised that she can get follow-up this week with either  him or his nurse practitioner.  I will treat with antibiotics.  I did discuss the patient the daughter the concerns for cancer as well as infection.  She would prefer to go home think that is reasonable along with Dr. Simpson advised that she can get close follow-up.  I did give her strict warning signs symptoms particular with worsening abdominal pain fever chills etc. [JM]      ED Course User Index  [JM] Arsalan Min APRJORDON                                        CT Abdomen Pelvis Without Contrast   Final Result   Impression:      1. Hyperdense mass within the posterior left side of the urinary bladder measuring up to 4.3 cm in size compatible with primary bladder neoplasm. Hyperdense clot related to urinary tract hemorrhage could potentially give this appearance although this is    felt to be less likely. No evidence of renal or ureteral stone or hydronephrosis.                  Electronically Signed: Mingo Cool MD     7/31/2024 2:29 PM EDT     Workstation ID: [id number]               Medical Decision Making  Problems Addressed:  Acute abdominal pain: complicated acute illness or injury  Bladder mass: complicated acute illness or injury  Hematuria, unspecified type: complicated acute illness or injury  Urinary retention: complicated acute illness or injury    Amount and/or Complexity of Data Reviewed  Labs: ordered.  Radiology: ordered.    Risk  Prescription drug management.        Final diagnoses:   Bladder mass   Hematuria, unspecified type   Urinary retention   Acute abdominal pain       ED Disposition  ED Disposition       ED Disposition   Discharge    Condition   Stable    Comment   --               Jesse Simpson MD  [address] [place], [place] [id number]  [phone]    Schedule an appointment as soon as possible for a visit       Jesse Simpson MD  [address] [place], [place] [id number] [phone]    Schedule an appointment as soon as possible for a visit            Medication List      No  changes were made to your prescriptions during this visit.            Arsalan Min, APRN  08/01/24 6606

## 2024-08-01 LAB — BACTERIA SPEC AEROBE CULT: NO GROWTH

## 2024-09-03 DIAGNOSIS — I10 ESSENTIAL HYPERTENSION: ICD-10-CM

## 2024-09-03 DIAGNOSIS — E78.2 MIXED HYPERLIPIDEMIA: ICD-10-CM

## 2024-09-03 DIAGNOSIS — K21.9 GASTROESOPHAGEAL REFLUX DISEASE WITHOUT ESOPHAGITIS: ICD-10-CM

## 2024-09-03 RX ORDER — AMLODIPINE BESYLATE 10 MG/1
10 TABLET ORAL DAILY
Qty: 90 TABLET | Refills: 0 | Status: SHIPPED | OUTPATIENT
Start: 2024-09-03

## 2024-09-03 RX ORDER — LISINOPRIL 40 MG/1
40 TABLET ORAL DAILY
Qty: 90 TABLET | Refills: 0 | Status: SHIPPED | OUTPATIENT
Start: 2024-09-03

## 2024-09-03 RX ORDER — HYDROCHLOROTHIAZIDE 25 MG/1
25 TABLET ORAL DAILY
Qty: 90 TABLET | Refills: 0 | Status: SHIPPED | OUTPATIENT
Start: 2024-09-03

## 2024-09-03 RX ORDER — ATORVASTATIN CALCIUM 20 MG/1
20 TABLET, FILM COATED ORAL DAILY
Qty: 90 TABLET | Refills: 0 | Status: SHIPPED | OUTPATIENT
Start: 2024-09-03

## 2024-10-31 ENCOUNTER — OFFICE VISIT (OUTPATIENT)
Dept: FAMILY MEDICINE CLINIC | Facility: CLINIC | Age: 79
End: 2024-10-31
Payer: MEDICARE

## 2024-10-31 ENCOUNTER — TELEPHONE (OUTPATIENT)
Dept: FAMILY MEDICINE CLINIC | Facility: CLINIC | Age: 79
End: 2024-10-31

## 2024-10-31 VITALS
HEIGHT: 65 IN | WEIGHT: 196 LBS | SYSTOLIC BLOOD PRESSURE: 150 MMHG | HEART RATE: 89 BPM | DIASTOLIC BLOOD PRESSURE: 90 MMHG | BODY MASS INDEX: 32.65 KG/M2 | OXYGEN SATURATION: 98 %

## 2024-10-31 DIAGNOSIS — C67.9 MALIGNANT NEOPLASM OF URINARY BLADDER, UNSPECIFIED SITE: ICD-10-CM

## 2024-10-31 DIAGNOSIS — Z85.3 PERSONAL HISTORY OF BREAST CANCER: ICD-10-CM

## 2024-10-31 DIAGNOSIS — B37.9 CANDIDIASIS: ICD-10-CM

## 2024-10-31 DIAGNOSIS — K21.9 GASTROESOPHAGEAL REFLUX DISEASE WITHOUT ESOPHAGITIS: ICD-10-CM

## 2024-10-31 DIAGNOSIS — E78.2 MIXED HYPERLIPIDEMIA: ICD-10-CM

## 2024-10-31 DIAGNOSIS — R73.9 HYPERGLYCEMIA: ICD-10-CM

## 2024-10-31 DIAGNOSIS — I10 ESSENTIAL HYPERTENSION: ICD-10-CM

## 2024-10-31 DIAGNOSIS — Z00.00 MEDICARE ANNUAL WELLNESS VISIT, SUBSEQUENT: Primary | ICD-10-CM

## 2024-10-31 RX ORDER — LISINOPRIL 40 MG/1
40 TABLET ORAL DAILY
Qty: 90 TABLET | Refills: 3 | Status: SHIPPED | OUTPATIENT
Start: 2024-10-31

## 2024-10-31 RX ORDER — NYSTATIN 100000 U/G
1 OINTMENT TOPICAL 2 TIMES DAILY
Qty: 30 G | Refills: 5 | Status: SHIPPED | OUTPATIENT
Start: 2024-10-31

## 2024-10-31 RX ORDER — ATORVASTATIN CALCIUM 20 MG/1
20 TABLET, FILM COATED ORAL DAILY
Qty: 90 TABLET | Refills: 3 | Status: SHIPPED | OUTPATIENT
Start: 2024-10-31

## 2024-10-31 RX ORDER — HYDROCHLOROTHIAZIDE 25 MG/1
25 TABLET ORAL DAILY
Qty: 90 TABLET | Refills: 3 | Status: SHIPPED | OUTPATIENT
Start: 2024-10-31

## 2024-10-31 RX ORDER — FLUCONAZOLE 150 MG/1
150 TABLET ORAL DAILY
Qty: 7 TABLET | Refills: 0 | Status: SHIPPED | OUTPATIENT
Start: 2024-10-31

## 2024-10-31 RX ORDER — AMLODIPINE BESYLATE 10 MG/1
10 TABLET ORAL DAILY
Qty: 90 TABLET | Refills: 3 | Status: SHIPPED | OUTPATIENT
Start: 2024-10-31

## 2024-10-31 NOTE — PROGRESS NOTES
Subjective   The ABCs of the Annual Wellness Visit  Medicare Wellness Visit      Letitia Salazar is a 79 y.o. patient who presents for a Medicare Wellness Visit.    The following portions of the patient's history were reviewed and   updated as appropriate: allergies, current medications, past family history, past medical history, past social history, past surgical history, and problem list.    Compared to one year ago, the patient's physical   health is the same.  Compared to one year ago, the patient's mental   health is the same.    Recent Hospitalizations:  She was not admitted to the hospital during the last year.     Current Medical Providers:  Patient Care Team:  Jeff Thompson MD as PCP - General (Family Medicine)  Fercho Bryant MD as Consulting Physician (Cardiology)    Outpatient Medications Prior to Visit   Medication Sig Dispense Refill    amLODIPine (NORVASC) 10 MG tablet TAKE 1 TABLET BY MOUTH EVERY DAY 90 tablet 0    atorvastatin (LIPITOR) 20 MG tablet TAKE 1 TABLET BY MOUTH EVERY DAY 90 tablet 0    cholecalciferol (VITAMIN D3) 1000 UNITS tablet Take 1 tablet by mouth Daily.      diphenhydrAMINE-acetaminophen (TYLENOL PM)  MG tablet per tablet Take 2 tablets by mouth At Night As Needed for Sleep.      doxycycline (VIBRAMYCIN) 100 MG capsule Take 1 capsule by mouth 2 (Two) Times a Day. 14 capsule 0    hydroCHLOROthiazide 25 MG tablet TAKE 1 TABLET BY MOUTH EVERY DAY 90 tablet 0    lisinopril (PRINIVIL,ZESTRIL) 40 MG tablet TAKE 1 TABLET BY MOUTH EVERY DAY 90 tablet 0    omeprazole (priLOSEC) 20 MG capsule TAKE 1 CAPSULE BY MOUTH EVERY DAY 90 capsule 0    predniSONE (DELTASONE) 20 MG tablet Take 3 tablets once daily for 2 days, then take 2 tablets once daily for 2 days, then take 1 tablet once daily for 2 days. 12 tablet 0    rOPINIRole (REQUIP) 0.25 MG tablet 1-2 po qhs 60 tablet 2     No facility-administered medications prior to visit.     No opioid medication identified on active  "medication list. I have reviewed chart for other potential  high risk medication/s and harmful drug interactions in the elderly.      Aspirin is not on active medication list.  Aspirin use is not indicated based on review of current medical condition/s. Risk of harm outweighs potential benefits.  .    Patient Active Problem List   Diagnosis    Breast cancer    Hot flashes related to aromatase inhibitor therapy    Ductal carcinoma in situ (DCIS) of left breast    History of right breast cancer    Axillary hidradenitis suppurativa    Essential hypertension    Mixed hyperlipidemia    Gastroesophageal reflux disease without esophagitis    Coronary artery calcification    Nasal congestion    Acute cough    Acute bacterial sinusitis    Cough in adult    Chronic bilateral low back pain with right-sided sciatica     Advance Care Planning Advance Directive is not on file.  ACP discussion was held with the patient during this visit. Patient does not have an advance directive, information provided.            Objective   Vitals:    10/31/24 0947   BP: 150/90   Pulse: 89   SpO2: 98%   Weight: 88.9 kg (196 lb)   Height: 165.1 cm (65\")   PainSc: 0-No pain       Estimated body mass index is 32.62 kg/m² as calculated from the following:    Height as of this encounter: 165.1 cm (65\").    Weight as of this encounter: 88.9 kg (196 lb).            Does the patient have evidence of cognitive impairment? No                                                                                                Health  Risk Assessment    Smoking Status:  Social History     Tobacco Use   Smoking Status Former    Current packs/day: 0.00    Types: Cigarettes    Quit date:     Years since quittin.8   Smokeless Tobacco Never     Alcohol Consumption:  Social History     Substance and Sexual Activity   Alcohol Use Not Currently       Fall Risk Screen  STEADI Fall Risk Assessment was completed, and patient is at LOW risk for falls.Assessment " completed on:10/31/2024    Depression Screening:      10/31/2024     9:46 AM   PHQ-2/PHQ-9 Depression Screening   Little interest or pleasure in doing things Not at all   Feeling down, depressed, or hopeless Not at all     Health Habits and Functional and Cognitive Screening:      10/31/2024     9:46 AM   Functional & Cognitive Status   Do you have difficulty preparing food and eating? No   Do you have difficulty bathing yourself, getting dressed or grooming yourself? No   Do you have difficulty using the toilet? No   Do you have difficulty moving around from place to place? No   Do you have trouble with steps or getting out of a bed or a chair? No   Current Diet Well Balanced Diet   Dental Exam Not up to date   Eye Exam Up to date   Exercise (times per week) 0 times per week   Current Exercises Include No Regular Exercise   Do you need help using the phone?  No   Are you deaf or do you have serious difficulty hearing?  No   Do you need help to go to places out of walking distance? No   Do you need help shopping? No   Do you need help preparing meals?  No   Do you need help with housework?  No   Do you need help with laundry? No   Do you need help taking your medications? No   Do you need help managing money? No   Do you ever drive or ride in a car without wearing a seat belt? No   Have you felt unusual stress, anger or loneliness in the last month? No   Who do you live with? Alone   If you need help, do you have trouble finding someone available to you? No   Have you been bothered in the last four weeks by sexual problems? No   Do you have difficulty concentrating, remembering or making decisions? No           Age-appropriate Screening Schedule:  Refer to the list below for future screening recommendations based on patient's age, sex and/or medical conditions. Orders for these recommended tests are listed in the plan section. The patient has been provided with a written plan.    Health Maintenance List  Health  Maintenance   Topic Date Due    DXA SCAN  Never done    COLORECTAL CANCER SCREENING  Never done    TDAP/TD VACCINES (1 - Tdap) Never done    ZOSTER VACCINE (1 of 2) Never done    ANNUAL WELLNESS VISIT  Never done    RSV Vaccine - Adults (1 - 1-dose 75+ series) Never done    COVID-19 Vaccine (2 - 2023-24 season) 09/01/2024    LIPID PANEL  02/12/2025    BMI FOLLOWUP  03/28/2025    MAMMOGRAM  06/17/2025    INFLUENZA VACCINE  Completed    Pneumococcal Vaccine 65+  Completed                                                                                                                                                CMS Preventative Services Quick Reference  Risk Factors Identified During Encounter  None Identified    The above risks/problems have been discussed with the patient.  Pertinent information has been shared with the patient in the After Visit Summary.  An After Visit Summary and PPPS were made available to the patient.    Follow Up:   Next Medicare Wellness visit to be scheduled in 1 year.         Additional E&M Note during same encounter follows:  Patient has additional, significant, and separately identifiable condition(s)/problem(s) that require work above and beyond the Medicare Wellness Visit     Chief Complaint  Annual Exam    Subjective    HPI  Radha is also being seen today for additional medical problem/s.       The patient is an 79-year-old female here for a recheck on her chronic conditions.    She recently visited the emergency room at [facility], where she was referred to Dr. Juarez at [facility]. She underwent surgery performed by Dr. Juarez and had a catheter in place for 30 days. She has been advised to undergo a cystoscopy every 3 months.    She has completed her pneumonia vaccinations and is current with her influenza vaccine. She has received 4 doses of the COVID-19 vaccine.    She has discontinued the use of ropinirole and prednisone. She is currently taking omeprazole for heartburn and  "acid reflux, lisinopril, hydrochlorothiazide for blood pressure and swelling, and amlodipine for blood pressure.    She has a history of breast cancer and undergoes annual screenings. Her daughter has suggested genetic testing for breast cancer.    She has been experiencing a yeast infection under her breast, which has worsened. She was prescribed Diflucan by her surgeon.    She also reports a sensation of fullness in her nose and morning facial swelling. She has tried Flonase nasal spray without relief.  Review of Systems   Constitutional:  Negative for fatigue.   HENT:  Negative for congestion.    Respiratory:  Negative for apnea, cough and shortness of breath.    Cardiovascular:  Negative for chest pain.   Gastrointestinal:  Negative for abdominal pain.   Musculoskeletal:  Negative for arthralgias.   Neurological:  Negative for dizziness.   Psychiatric/Behavioral:  The patient is not nervous/anxious.           Objective   Vital Signs:  /90   Pulse 89   Ht 165.1 cm (65\")   Wt 88.9 kg (196 lb)   SpO2 98%   BMI 32.62 kg/m²   Physical Exam  Vitals and nursing note reviewed.   Constitutional:       General: She is not in acute distress.     Appearance: Normal appearance. She is not ill-appearing.   HENT:      Head: Normocephalic and atraumatic.      Right Ear: Tympanic membrane and ear canal normal.      Left Ear: Tympanic membrane and ear canal normal.      Nose: Nose normal.   Cardiovascular:      Rate and Rhythm: Normal rate and regular rhythm.      Heart sounds: Normal heart sounds.   Pulmonary:      Effort: Pulmonary effort is normal.      Breath sounds: Normal breath sounds.   Neurological:      Mental Status: She is alert and oriented to person, place, and time. Mental status is at baseline.   Psychiatric:         Mood and Affect: Mood normal.           Vital Signs  Blood pressure readings are 130/66 and 130/70.    The following data was reviewed by: Jeff Thompson MD on 10/31/2024:       "   Assessment and Plan Additional age appropriate preventative wellness advice topics were discussed during today's preventative wellness exam(some topics already addressed during AWV portion of the note above):    Physical Activity: Advised cardiovascular activity 150 minutes per week as tolerated. (example brisk walk for 30 minutes, 5 days a week).     Nutrition: Discussed nutrition plan with patient. Information shared in after visit summary. Goal is for a well balanced diet to enhance overall health.     Healthy Weight: Discussed current and goal BMI with patient. Steps to attain this goal discussed. Information shared in after visit summary.          1. Chronic condition management.  Her blood pressure readings have been satisfactory, although today's reading is slightly elevated. She is advised to monitor her blood pressure at home. Her blood counts are within normal range, indicating no anemia. Her iron and B12 levels are satisfactory. Her HDL cholesterol is consistently low, but her LDL cholesterol is optimal. Her triglycerides are slightly above the desired level of 150, with recent readings of 156 and 164. Her liver function tests, electrolytes, and kidney function tests are stable. Blood work will be ordered today to monitor her sugar levels.     2. Yeast infection under the breast.  She was advised to continue using the ointment daily for the next 6 weeks. A prescription for Diflucan will be sent to her pharmacy.    3. Nasal congestion.  She was advised to use Flonase nasal spray and an antihistamine such as Claritin, Zyrtec, Allegra, or Benadryl for her nasal symptoms. She was instructed to use these medications daily for at least the next month.    4. Genetic testing for breast cancer.  Genetic testing for breast cancer was recommended due to her personal history and family concerns. A referral to genetic counseling through [facility] will be placed.    5. Health Maintenance.  She has completed  her pneumonia vaccinations and is up-to-date with her influenza vaccine. She was advised to receive the influenza vaccine annually. She was also informed about the RSV vaccine and the tetanus shot, but she declined them. She has had four COVID-19 vaccines and was informed that she is eligible for another one, but she declined.         Essential hypertension  Hypertension is borderline  Continue current treatment regimen.  Ambulatory blood pressure monitoring.  Blood pressure will be reassessedat the next regular appointment.  Hyperglycemia    Gastroesophageal reflux disease without esophagitis    Mixed hyperlipidemia   Lipid abnormalities are stable    Plan:  Continue same medication/s without change.      Discussed medication dosage, use, side effects, and goals of treatment in detail.    Counseled patient on lifestyle modifications to help control hyperlipidemia.     Patient Treatment Goals:   LDL goal is less than 70    Followup in 6 months.  Personal history of breast cancer    Candidiasis    Medicare annual wellness visit, subsequent  Updated annual wellness visit checklist.  Immunizations discussed.  Screening up-to-date.  Recommend yearly dental and eye exams. Also discussed monitoring of blood pressure and lipids. We addressed patient self-assessment of health status, frailty, and physical functioning. We reviewed psychosocial risks, behavioral risks, instrumental activities of daily living, and patient health risk assessment. Patient was given a personalized prevention plan.   Malignant neoplasm of urinary bladder, unspecified site      No orders of the defined types were placed in this encounter.            Follow Up   No follow-ups on file.  Patient was given instructions and counseling regarding her condition or for health maintenance advice. Please see specific information pulled into the AVS if appropriate.  Patient or patient representative verbalized consent for the use of Ambient Listening during  the visit with  Jeff Thompson MD for chart documentation. 10/31/2024  12:58 EDT

## 2024-10-31 NOTE — TELEPHONE ENCOUNTER
[facility] did not receive refill requests even though chart says confirmed receipt. Please re-send.

## 2024-10-31 NOTE — ASSESSMENT & PLAN NOTE
Hypertension is borderline  Continue current treatment regimen.  Ambulatory blood pressure monitoring.  Blood pressure will be reassessedat the next regular appointment.

## 2024-11-01 LAB
ALBUMIN SERPL-MCNC: 4.5 G/DL (ref 3.8–4.8)
ALP SERPL-CCNC: 118 IU/L (ref 44–121)
ALT SERPL-CCNC: 19 IU/L (ref 0–32)
AST SERPL-CCNC: 20 IU/L (ref 0–40)
BASOPHILS # BLD AUTO: 0.1 X10E3/UL (ref 0–0.2)
BASOPHILS NFR BLD AUTO: 1 %
BILIRUB SERPL-MCNC: 0.3 MG/DL (ref 0–1.2)
BUN SERPL-MCNC: 19 MG/DL (ref 8–27)
BUN/CREAT SERPL: 18 (ref 12–28)
CALCIUM SERPL-MCNC: 10 MG/DL (ref 8.7–10.3)
CHLORIDE SERPL-SCNC: 100 MMOL/L (ref 96–106)
CHOLEST SERPL-MCNC: 150 MG/DL (ref 100–199)
CO2 SERPL-SCNC: 24 MMOL/L (ref 20–29)
CREAT SERPL-MCNC: 1.05 MG/DL (ref 0.57–1)
EGFRCR SERPLBLD CKD-EPI 2021: 54 ML/MIN/1.73
EOSINOPHIL # BLD AUTO: 0.1 X10E3/UL (ref 0–0.4)
EOSINOPHIL NFR BLD AUTO: 1 %
ERYTHROCYTE [DISTWIDTH] IN BLOOD BY AUTOMATED COUNT: 13.5 % (ref 11.7–15.4)
GLOBULIN SER CALC-MCNC: 2.9 G/DL (ref 1.5–4.5)
GLUCOSE SERPL-MCNC: 116 MG/DL (ref 70–99)
HBA1C MFR BLD: 6.6 % (ref 4.8–5.6)
HCT VFR BLD AUTO: 39.5 % (ref 34–46.6)
HDLC SERPL-MCNC: 32 MG/DL
HGB BLD-MCNC: 12.3 G/DL (ref 11.1–15.9)
IMM GRANULOCYTES # BLD AUTO: 0.1 X10E3/UL (ref 0–0.1)
IMM GRANULOCYTES NFR BLD AUTO: 1 %
LDLC SERPL CALC-MCNC: 76 MG/DL (ref 0–99)
LYMPHOCYTES # BLD AUTO: 2.4 X10E3/UL (ref 0.7–3.1)
LYMPHOCYTES NFR BLD AUTO: 25 %
MCH RBC QN AUTO: 26.9 PG (ref 26.6–33)
MCHC RBC AUTO-ENTMCNC: 31.1 G/DL (ref 31.5–35.7)
MCV RBC AUTO: 86 FL (ref 79–97)
MONOCYTES # BLD AUTO: 0.8 X10E3/UL (ref 0.1–0.9)
MONOCYTES NFR BLD AUTO: 8 %
NEUTROPHILS # BLD AUTO: 6.2 X10E3/UL (ref 1.4–7)
NEUTROPHILS NFR BLD AUTO: 64 %
PLATELET # BLD AUTO: 415 X10E3/UL (ref 150–450)
POTASSIUM SERPL-SCNC: 4.3 MMOL/L (ref 3.5–5.2)
PROT SERPL-MCNC: 7.4 G/DL (ref 6–8.5)
RBC # BLD AUTO: 4.58 X10E6/UL (ref 3.77–5.28)
SODIUM SERPL-SCNC: 139 MMOL/L (ref 134–144)
TRIGL SERPL-MCNC: 254 MG/DL (ref 0–149)
TSH SERPL DL<=0.005 MIU/L-ACNC: 2.03 UIU/ML (ref 0.45–4.5)
VLDLC SERPL CALC-MCNC: 42 MG/DL (ref 5–40)
WBC # BLD AUTO: 9.6 X10E3/UL (ref 3.4–10.8)

## 2025-03-05 ENCOUNTER — TRANSCRIBE ORDERS (OUTPATIENT)
Dept: ADMINISTRATIVE | Facility: HOSPITAL | Age: 80
End: 2025-03-05
Payer: MEDICARE

## 2025-03-05 DIAGNOSIS — Z12.31 VISIT FOR SCREENING MAMMOGRAM: Primary | ICD-10-CM

## 2025-03-08 ENCOUNTER — OFFICE VISIT (OUTPATIENT)
Dept: FAMILY MEDICINE CLINIC | Facility: CLINIC | Age: 80
End: 2025-03-08
Payer: MEDICARE

## 2025-03-08 VITALS
HEART RATE: 106 BPM | SYSTOLIC BLOOD PRESSURE: 124 MMHG | OXYGEN SATURATION: 94 % | WEIGHT: 192 LBS | TEMPERATURE: 98.6 F | HEIGHT: 65 IN | BODY MASS INDEX: 31.99 KG/M2 | DIASTOLIC BLOOD PRESSURE: 84 MMHG

## 2025-03-08 DIAGNOSIS — R05.9 COUGH IN ADULT: ICD-10-CM

## 2025-03-08 DIAGNOSIS — J10.1 INFLUENZA A: Primary | ICD-10-CM

## 2025-03-08 DIAGNOSIS — J02.9 SORE THROAT: ICD-10-CM

## 2025-03-08 DIAGNOSIS — R50.9 FEVER IN ADULT: ICD-10-CM

## 2025-03-08 DIAGNOSIS — R52 GENERALIZED BODY ACHES: ICD-10-CM

## 2025-03-08 DIAGNOSIS — R09.81 NASAL CONGESTION: ICD-10-CM

## 2025-03-08 LAB
EXPIRATION DATE: ABNORMAL
EXPIRATION DATE: NORMAL
FLUAV AG UPPER RESP QL IA.RAPID: DETECTED
FLUBV AG UPPER RESP QL IA.RAPID: NOT DETECTED
INTERNAL CONTROL: ABNORMAL
INTERNAL CONTROL: NORMAL
Lab: ABNORMAL
Lab: NORMAL
S PYO AG THROAT QL: NEGATIVE
SARS-COV-2 AG UPPER RESP QL IA.RAPID: NOT DETECTED

## 2025-03-08 RX ORDER — OSELTAMIVIR PHOSPHATE 75 MG/1
75 CAPSULE ORAL 2 TIMES DAILY
Qty: 10 CAPSULE | Refills: 0 | Status: SHIPPED | OUTPATIENT
Start: 2025-03-08

## 2025-03-08 RX ORDER — GUAIFENESIN 600 MG/1
1200 TABLET, EXTENDED RELEASE ORAL 2 TIMES DAILY
Qty: 20 TABLET | Refills: 0 | Status: SHIPPED | OUTPATIENT
Start: 2025-03-08

## 2025-03-08 RX ORDER — DEXTROMETHORPHAN HYDROBROMIDE AND PROMETHAZINE HYDROCHLORIDE 15; 6.25 MG/5ML; MG/5ML
5 SYRUP ORAL 4 TIMES DAILY PRN
Qty: 240 ML | Refills: 0 | Status: SHIPPED | OUTPATIENT
Start: 2025-03-08

## 2025-03-08 NOTE — PROGRESS NOTES
"    Office Note     Name: Letitia Salazar    : 1945     MRN: [id number]     Chief Complaint  Wheezing, Cough (Pt has been coughing since Wednesday and can't get anything up ), Fever, and Chills    Subjective     History of Present Illness:  Letitia Salazar is a 79 y.o. female who presents today for illness.     She reports she has had cough, congestion, low-grade fever and chills for the past 2 days. She reports mild sore throat started Wednesday night, then she woke Thursday feeling poorly with the other symptoms. She has had intermittent wheezing at home, but denies current wheezing or shortness of breath. She reports she feels that her postnasal drainage/mucus is very thick and she has a hard time coughing it up; feels like it gets \"stuck\" in the back of her throat.       Objective     Past Medical History:   Diagnosis Date    AK (actinic keratosis)     Breast cancer 2017    LT    Breast cancer 2005    RT     Cataract     Chronic GERD     Class 1 obesity due to excess calories with serious comorbidity and body mass index (BMI) of 34.0 to 34.9 in adult     Colonoscopy refused     Essential (primary) hypertension     High risk medication use     Hx of radiation therapy 2005    RT    Impacted cerumen of right ear     Mixed hyperlipidemia 2008     Past Surgical History:   Procedure Laterality Date    BREAST BIOPSY Right 2005    BREAST BIOPSY Left 2017    u/s bx     BREAST LUMPECTOMY Right 2005    BREAST LUMPECTOMY Left 2017    CATARACT EXTRACTION      OTHER SURGICAL HISTORY      sweatgland surgery     Family History   Problem Relation Age of Onset    Esophageal cancer Mother     Thyroid cancer Mother     Breast cancer Sister 80    Lung cancer Sister     Hypertension Brother     Hyperlipidemia Other     Endometrial cancer Neg Hx     Ovarian cancer Neg Hx        Vital Signs  /84 (BP Location: Left arm, Patient Position: Sitting)   Pulse 106   Temp 98.6 °F (37 °C) (Oral)   Ht 165.1 cm " "(65\")   Wt 87.1 kg (192 lb)   SpO2 94%   BMI 31.95 kg/m²   Estimated body mass index is 31.95 kg/m² as calculated from the following:    Height as of this encounter: 165.1 cm (65\").    Weight as of this encounter: 87.1 kg (192 lb).    Physical Exam  Vitals reviewed.   Constitutional:       Appearance: Normal appearance.   HENT:      Head: Normocephalic.      Right Ear: Tympanic membrane, ear canal and external ear normal.      Left Ear: Tympanic membrane, ear canal and external ear normal.      Nose: Congestion present.      Mouth/Throat:      Mouth: Mucous membranes are moist.      Pharynx: Posterior oropharyngeal erythema (mild) present.   Cardiovascular:      Rate and Rhythm: Normal rate and regular rhythm.      Heart sounds: Normal heart sounds.   Pulmonary:      Effort: Pulmonary effort is normal. No respiratory distress.      Breath sounds: Normal breath sounds. No stridor. No wheezing, rhonchi or rales.   Skin:     General: Skin is warm and dry.   Neurological:      General: No focal deficit present.      Mental Status: She is alert and oriented to person, place, and time.   Psychiatric:         Mood and Affect: Mood normal.         Behavior: Behavior normal.          POCT Results (if applicable):  Results for orders placed or performed in visit on 03/08/25   POCT SARS-CoV-2 + Flu Antigen ANDREW    Collection Time: 03/08/25  9:50 AM    Specimen: Swab   Result Value Ref Range    SARS Antigen Not Detected Not Detected, Presumptive Negative    Influenza A Antigen ANDREW Detected (A) Not Detected    Influenza B Antigen ANDREW Not Detected Not Detected    Internal Control Passed Passed    Lot Number [id number]     Expiration Date 11/27/2025    POC Rapid Strep A    Collection Time: 03/08/25  9:51 AM    Specimen: Swab   Result Value Ref Range    Rapid Strep A Screen Negative Negative, VALID, INVALID, Not Performed    Internal Control Passed Passed    Lot Number [id number]     Expiration Date 11/27/2025         "     Assessment and Plan     Diagnoses and all orders for this visit:    1. Influenza A (Primary)  -     oseltamivir (Tamiflu) 75 MG capsule; Take 1 capsule by mouth 2 (Two) Times a Day.  Dispense: 10 capsule; Refill: 0    2. Fever in adult  -     POCT SARS-CoV-2 + Flu Antigen ANDREW  -     POC Rapid Strep A    3. Sore throat    4. Cough in adult  -     promethazine-dextromethorphan (PROMETHAZINE-DM) 6.25-15 MG/5ML syrup; Take 5 mL by mouth 4 (Four) Times a Day As Needed for Cough.  Dispense: 240 mL; Refill: 0    5. Nasal congestion  -     guaiFENesin (Mucinex) 600 MG 12 hr tablet; Take 2 tablets by mouth 2 (Two) Times a Day.  Dispense: 20 tablet; Refill: 0    6. Generalized body aches        Influenza A positive. COVID, Influenza B, and Strep A negative.     We discussed treatment options for Influenza, risks/benefits, and possible side effects associated. Tamiflu 75mg BID for 5 days with be initiated today. Encouraged to take this with food to limit GI upset. Mucinex 600mg BID and Promethazine-DM QID prn also prescribed to manage symptoms. Prescriptions sent to [facility].    Encouraged Tylenol/Motrin for pain/fever.  Nasal saline spray recommended. Cool mist humidifier at the bedside. Patient verbalized understanding and is agreeable to treatment plan.  Will return if symptoms worsen and/or persist.       Follow Up  Return if symptoms worsen or fail to improve.      Patient or patient representative verbalized consent for the use of Ambient Listening during the visit with  BERNARD Magdaleno for chart documentation. 3/8/2025  09:44 EST    BERNARD Magdaleno

## 2025-04-16 ENCOUNTER — OFFICE VISIT (OUTPATIENT)
Dept: FAMILY MEDICINE CLINIC | Facility: CLINIC | Age: 80
End: 2025-04-16
Payer: MEDICARE

## 2025-04-16 VITALS
HEART RATE: 97 BPM | HEIGHT: 65 IN | OXYGEN SATURATION: 97 % | BODY MASS INDEX: 32.32 KG/M2 | WEIGHT: 194 LBS | DIASTOLIC BLOOD PRESSURE: 72 MMHG | SYSTOLIC BLOOD PRESSURE: 158 MMHG

## 2025-04-16 DIAGNOSIS — R21 RASH: ICD-10-CM

## 2025-04-16 DIAGNOSIS — R55 VASOVAGAL SYNCOPE: Primary | ICD-10-CM

## 2025-04-16 PROCEDURE — 93005 ELECTROCARDIOGRAM TRACING: CPT | Performed by: NURSE PRACTITIONER

## 2025-04-16 PROCEDURE — 99214 OFFICE O/P EST MOD 30 MIN: CPT | Performed by: NURSE PRACTITIONER

## 2025-04-16 PROCEDURE — 3077F SYST BP >= 140 MM HG: CPT | Performed by: NURSE PRACTITIONER

## 2025-04-16 PROCEDURE — 3078F DIAST BP <80 MM HG: CPT | Performed by: NURSE PRACTITIONER

## 2025-04-16 PROCEDURE — 1126F AMNT PAIN NOTED NONE PRSNT: CPT | Performed by: NURSE PRACTITIONER

## 2025-04-16 RX ORDER — TERBINAFINE HYDROCHLORIDE 250 MG/1
250 TABLET ORAL DAILY
Qty: 14 TABLET | Refills: 0 | Status: SHIPPED | OUTPATIENT
Start: 2025-04-16

## 2025-04-16 RX ORDER — CLOTRIMAZOLE AND BETAMETHASONE DIPROPIONATE 10; .64 MG/G; MG/G
1 CREAM TOPICAL 2 TIMES DAILY
Qty: 15 G | Refills: 0 | Status: SHIPPED | OUTPATIENT
Start: 2025-04-16

## 2025-04-16 RX ORDER — CLOBETASOL PROPIONATE 0.5 MG/ML
SOLUTION TOPICAL DAILY
Qty: 25 ML | Refills: 1 | Status: SHIPPED | OUTPATIENT
Start: 2025-04-16

## 2025-04-16 NOTE — PROGRESS NOTES
"Chief Complaint  yeast under breast (Pt complains of yeast under breast onset over 1 year. ) and Syncope    Subjective          Letitia Salazar presents to [facility]  History of Present Illness  Pt has had a rash under her breast for the past year. She denies pain or itching of the rash. She has tried Nystatin and Diflucan without improvement. She states the rash has now spread to her groin area. She has had lesions in her scalp for years. She has seen dermatology for this. She had diarrhea on Monday and had abdominal cramping. She was sitting on the toilet with the cramping and had a syncopal episode. She denies chest pain, dyspnea, or subsequent episodes of syncope.       History of Present Illness      Objective   Vital Signs:   /72   Pulse 97   Ht 165.1 cm (65\")   Wt 88 kg (194 lb)   SpO2 97%   BMI 32.28 kg/m²     Body mass index is 32.28 kg/m².    Review of Systems   Constitutional:  Negative for fatigue and fever.   Respiratory:  Negative for shortness of breath.    Cardiovascular:  Negative for chest pain, palpitations and leg swelling.   Skin:  Positive for rash.   Neurological:  Positive for syncope.   Psychiatric/Behavioral:  The patient is not nervous/anxious.           Current Outpatient Medications:     amLODIPine (NORVASC) 10 MG tablet, Take 1 tablet by mouth Daily., Disp: 90 tablet, Rfl: 3    atorvastatin (LIPITOR) 20 MG tablet, Take 1 tablet by mouth Daily., Disp: 90 tablet, Rfl: 3    cholecalciferol (VITAMIN D3) 1000 UNITS tablet, Take 1 tablet by mouth Daily., Disp: , Rfl:     diphenhydrAMINE-acetaminophen (TYLENOL PM)  MG tablet per tablet, Take 2 tablets by mouth At Night As Needed for Sleep., Disp: , Rfl:     hydroCHLOROthiazide 25 MG tablet, Take 1 tablet by mouth Daily., Disp: 90 tablet, Rfl: 3    lisinopril (PRINIVIL,ZESTRIL) 40 MG tablet, Take 1 tablet by mouth Daily., Disp: 90 tablet, Rfl: 3    nystatin (MYCOSTATIN) 847963 UNIT/GM ointment, " Apply 1 Application topically to the appropriate area as directed 2 (Two) Times a Day., Disp: 30 g, Rfl: 5    omeprazole (priLOSEC) 20 MG capsule, Take 1 capsule by mouth Daily., Disp: 90 capsule, Rfl: 3    clobetasol (TEMOVATE) 0.05 % external solution, Apply  topically to the appropriate area as directed Daily., Disp: 25 mL, Rfl: 1    clotrimazole-betamethasone (LOTRISONE) 1-0.05 % cream, Apply 1 Application topically to the appropriate area as directed 2 (Two) Times a Day., Disp: 15 g, Rfl: 0    terbinafine (lamiSIL) 250 MG tablet, Take 1 tablet by mouth Daily., Disp: 14 tablet, Rfl: 0      Allergies: Patient has no known allergies.    Physical Exam  Constitutional:       Appearance: Normal appearance.   HENT:      Head: Normocephalic.   Eyes:      Conjunctiva/sclera: Conjunctivae normal.      Pupils: Pupils are equal, round, and reactive to light.   Cardiovascular:      Rate and Rhythm: Normal rate and regular rhythm.      Heart sounds: Normal heart sounds.   Pulmonary:      Effort: Pulmonary effort is normal.      Breath sounds: Normal breath sounds.   Abdominal:      Tenderness: There is no abdominal tenderness.   Musculoskeletal:         General: Normal range of motion.   Skin:     General: Skin is warm and dry.      Capillary Refill: Capillary refill takes less than 2 seconds.   Neurological:      General: No focal deficit present.      Mental Status: She is alert and oriented to person, place, and time.   Psychiatric:         Mood and Affect: Mood normal.         Behavior: Behavior normal.         Thought Content: Thought content normal.         Judgment: Judgment normal.          Physical Exam         Result Review :            ECG 12 Lead    Date/Time: 4/16/2025 3:38 PM  Performed by: Lisa Stanton APRN    Authorized by: Lisa Stanton APRN  Comparison: compared with previous ECG from 5/17/2021  Similar to previous ECG  Rhythm: sinus rhythm  Rate: normal  ST Segments: ST segments normal  QRS  axis: normal    Clinical impression: normal ECG           Results            Assessment and Plan    Diagnoses and all orders for this visit:    1. Vasovagal syncope (Primary)  Comments:  EKG and labs done. Increase fluids and change positions slowly. Sx have improved. RTC or go to ER for worsened sx.  Orders:  -     CBC & Differential  -     Comprehensive Metabolic Panel  -     ECG 12 Lead    2. Rash  Comments:  Try Clobetasol solution for scalp. Finish Lamisil and use ointment under breasts. F/U with derm if not improving.  Orders:  -     clobetasol (TEMOVATE) 0.05 % external solution; Apply  topically to the appropriate area as directed Daily.  Dispense: 25 mL; Refill: 1  -     terbinafine (lamiSIL) 250 MG tablet; Take 1 tablet by mouth Daily.  Dispense: 14 tablet; Refill: 0  -     clotrimazole-betamethasone (LOTRISONE) 1-0.05 % cream; Apply 1 Application topically to the appropriate area as directed 2 (Two) Times a Day.  Dispense: 15 g; Refill: 0                  Follow Up   No follow-ups on file.  Patient was given instructions and counseling regarding her condition or for health maintenance advice. Please see specific information pulled into the AVS if appropriate.     BERNARD Keys

## 2025-04-17 LAB
ALBUMIN SERPL-MCNC: 4.6 G/DL (ref 3.8–4.8)
ALP SERPL-CCNC: 114 IU/L (ref 44–121)
ALT SERPL-CCNC: 18 IU/L (ref 0–32)
AST SERPL-CCNC: 20 IU/L (ref 0–40)
BASOPHILS # BLD AUTO: 0 X10E3/UL (ref 0–0.2)
BASOPHILS NFR BLD AUTO: 0 %
BILIRUB SERPL-MCNC: <0.2 MG/DL (ref 0–1.2)
BUN SERPL-MCNC: 18 MG/DL (ref 8–27)
BUN/CREAT SERPL: 17 (ref 12–28)
CALCIUM SERPL-MCNC: 9.7 MG/DL (ref 8.7–10.3)
CHLORIDE SERPL-SCNC: 101 MMOL/L (ref 96–106)
CO2 SERPL-SCNC: 22 MMOL/L (ref 20–29)
CREAT SERPL-MCNC: 1.09 MG/DL (ref 0.57–1)
EGFRCR SERPLBLD CKD-EPI 2021: 51 ML/MIN/1.73
EOSINOPHIL # BLD AUTO: 0.1 X10E3/UL (ref 0–0.4)
EOSINOPHIL NFR BLD AUTO: 1 %
ERYTHROCYTE [DISTWIDTH] IN BLOOD BY AUTOMATED COUNT: 14.6 % (ref 11.7–15.4)
GLOBULIN SER CALC-MCNC: 2.9 G/DL (ref 1.5–4.5)
GLUCOSE SERPL-MCNC: 106 MG/DL (ref 70–99)
HCT VFR BLD AUTO: 38.1 % (ref 34–46.6)
HGB BLD-MCNC: 12.4 G/DL (ref 11.1–15.9)
IMM GRANULOCYTES # BLD AUTO: 0.1 X10E3/UL (ref 0–0.1)
IMM GRANULOCYTES NFR BLD AUTO: 1 %
LYMPHOCYTES # BLD AUTO: 2.5 X10E3/UL (ref 0.7–3.1)
LYMPHOCYTES NFR BLD AUTO: 23 %
MCH RBC QN AUTO: 28.8 PG (ref 26.6–33)
MCHC RBC AUTO-ENTMCNC: 32.5 G/DL (ref 31.5–35.7)
MCV RBC AUTO: 88 FL (ref 79–97)
MONOCYTES # BLD AUTO: 0.8 X10E3/UL (ref 0.1–0.9)
MONOCYTES NFR BLD AUTO: 8 %
NEUTROPHILS # BLD AUTO: 7.2 X10E3/UL (ref 1.4–7)
NEUTROPHILS NFR BLD AUTO: 67 %
PLATELET # BLD AUTO: 403 X10E3/UL (ref 150–450)
POTASSIUM SERPL-SCNC: 4.4 MMOL/L (ref 3.5–5.2)
PROT SERPL-MCNC: 7.5 G/DL (ref 6–8.5)
RBC # BLD AUTO: 4.31 X10E6/UL (ref 3.77–5.28)
SODIUM SERPL-SCNC: 140 MMOL/L (ref 134–144)
WBC # BLD AUTO: 10.7 X10E3/UL (ref 3.4–10.8)

## 2025-06-19 ENCOUNTER — HOSPITAL ENCOUNTER (OUTPATIENT)
Dept: MAMMOGRAPHY | Facility: HOSPITAL | Age: 80
Discharge: HOME | End: 2025-06-19
Admitting: FAMILY MEDICINE
Payer: MEDICARE

## 2025-06-19 DIAGNOSIS — Z12.31 VISIT FOR SCREENING MAMMOGRAM: ICD-10-CM

## 2025-06-19 PROCEDURE — 77063 BREAST TOMOSYNTHESIS BI: CPT

## 2025-06-19 PROCEDURE — 77067 SCR MAMMO BI INCL CAD: CPT
